# Patient Record
Sex: MALE | Race: WHITE | NOT HISPANIC OR LATINO | Employment: OTHER | URBAN - METROPOLITAN AREA
[De-identification: names, ages, dates, MRNs, and addresses within clinical notes are randomized per-mention and may not be internally consistent; named-entity substitution may affect disease eponyms.]

---

## 2017-08-18 DIAGNOSIS — I25.10 CORONARY ARTERY DISEASE INVOLVING NATIVE CORONARY ARTERY OF NATIVE HEART WITHOUT ANGINA PECTORIS: ICD-10-CM

## 2017-08-18 RX ORDER — IRBESARTAN 300 MG/1
300 TABLET ORAL AT BEDTIME
Qty: 90 TABLET | Refills: 1 | Status: SHIPPED | OUTPATIENT
Start: 2017-08-18 | End: 2017-12-26

## 2017-12-18 ENCOUNTER — PRE VISIT (OUTPATIENT)
Dept: CARDIOLOGY | Facility: CLINIC | Age: 67
End: 2017-12-18

## 2017-12-26 ENCOUNTER — OFFICE VISIT (OUTPATIENT)
Dept: CARDIOLOGY | Facility: CLINIC | Age: 67
End: 2017-12-26
Attending: NURSE PRACTITIONER
Payer: MEDICARE

## 2017-12-26 VITALS
BODY MASS INDEX: 25.97 KG/M2 | DIASTOLIC BLOOD PRESSURE: 85 MMHG | HEART RATE: 56 BPM | HEIGHT: 71 IN | SYSTOLIC BLOOD PRESSURE: 162 MMHG | WEIGHT: 185.5 LBS

## 2017-12-26 DIAGNOSIS — E78.2 MIXED HYPERLIPIDEMIA: ICD-10-CM

## 2017-12-26 DIAGNOSIS — I25.10 CORONARY ARTERY DISEASE INVOLVING NATIVE CORONARY ARTERY OF NATIVE HEART WITHOUT ANGINA PECTORIS: ICD-10-CM

## 2017-12-26 DIAGNOSIS — I10 BENIGN ESSENTIAL HYPERTENSION: Primary | ICD-10-CM

## 2017-12-26 LAB
CHOLEST SERPL-MCNC: 153 MG/DL
HDLC SERPL-MCNC: 37 MG/DL
LDLC SERPL CALC-MCNC: 84 MG/DL
NONHDLC SERPL-MCNC: 116 MG/DL
TRIGL SERPL-MCNC: 161 MG/DL

## 2017-12-26 PROCEDURE — 80061 LIPID PANEL: CPT | Performed by: INTERNAL MEDICINE

## 2017-12-26 PROCEDURE — 99214 OFFICE O/P EST MOD 30 MIN: CPT | Performed by: INTERNAL MEDICINE

## 2017-12-26 PROCEDURE — 36415 COLL VENOUS BLD VENIPUNCTURE: CPT | Performed by: INTERNAL MEDICINE

## 2017-12-26 RX ORDER — HYDROCHLOROTHIAZIDE 12.5 MG/1
25 TABLET ORAL DAILY
Qty: 60 TABLET | Refills: 11 | Status: SHIPPED | OUTPATIENT
Start: 2017-12-26 | End: 2018-03-16

## 2017-12-26 RX ORDER — NITROGLYCERIN 0.4 MG/1
0.4 TABLET SUBLINGUAL EVERY 5 MIN PRN
Qty: 25 TABLET | Refills: 12 | Status: SHIPPED | OUTPATIENT
Start: 2017-12-26 | End: 2018-07-10

## 2017-12-26 RX ORDER — OMEGA-3 FATTY ACIDS/FISH OIL 300-1000MG
200 CAPSULE ORAL EVERY 4 HOURS PRN
COMMUNITY
End: 2018-02-16

## 2017-12-26 RX ORDER — METOPROLOL TARTRATE 25 MG/1
25 TABLET, FILM COATED ORAL 2 TIMES DAILY
Qty: 180 TABLET | Refills: 12 | Status: SHIPPED | OUTPATIENT
Start: 2017-12-26 | End: 2018-02-16

## 2017-12-26 RX ORDER — IRBESARTAN 300 MG/1
300 TABLET ORAL AT BEDTIME
Qty: 90 TABLET | Refills: 1 | Status: SHIPPED | OUTPATIENT
Start: 2017-12-26 | End: 2018-08-10

## 2017-12-26 RX ORDER — ATORVASTATIN CALCIUM 80 MG/1
80 TABLET, FILM COATED ORAL DAILY
Qty: 90 TABLET | Refills: 12 | Status: SHIPPED | OUTPATIENT
Start: 2017-12-26 | End: 2018-02-27

## 2017-12-26 NOTE — PROGRESS NOTES
HISTORY OF PRESENT ILLNESS:  Asymptomatic. Weight up 10 lbs.     Orders this Visit:  No orders of the defined types were placed in this encounter.    Orders Placed This Encounter   Medications     ibuprofen (ADVIL) 200 MG capsule     Sig: Take 200 mg by mouth every 4 hours as needed for fever     hydrochlorothiazide 12.5 MG TABS tablet     Sig: Take 2 tablets (25 mg) by mouth daily     Dispense:  60 tablet     Refill:  11     There are no discontinued medications.    Encounter Diagnoses   Name Primary?     Coronary artery disease involving native coronary artery of native heart without angina pectoris      Mixed hyperlipidemia      Benign essential hypertension Yes       CURRENT MEDICATIONS:  Current Outpatient Prescriptions   Medication Sig Dispense Refill     ibuprofen (ADVIL) 200 MG capsule Take 200 mg by mouth every 4 hours as needed for fever       hydrochlorothiazide 12.5 MG TABS tablet Take 2 tablets (25 mg) by mouth daily 60 tablet 11     irbesartan (AVAPRO) 300 MG tablet Take 1 tablet (300 mg) by mouth At Bedtime 90 tablet 1     metoprolol (LOPRESSOR) 25 MG tablet Take 1 tablet (25 mg) by mouth 2 times daily 180 tablet 12     nitroglycerin (NITROSTAT) 0.4 MG sublingual tablet Place 1 tablet (0.4 mg) under the tongue every 5 minutes as needed for chest pain 25 tablet 12     atorvastatin (LIPITOR) 80 MG tablet Take 1 tablet (80 mg) by mouth daily 90 tablet 12     Multiple Vitamins-Minerals (PRESERVISION AREDS 2) CAPS Take by mouth daily       Multiple vitamin TABS Take by mouth daily        Ibuprofen-Diphenhydramine Cit (ADVIL PM) 200-38 MG TABS Take by mouth every evening as needed       aspirin 81 MG tablet Take 81 mg by mouth daily         ALLERGIES     Allergies   Allergen Reactions     Penicillins Rash       PAST MEDICAL, SURGICAL, FAMILY, SOCIAL HISTORY:  History was reviewed and updated as needed, see medical record.    Review of Systems:  A 12-point review of systems was completed, see medical record  "for detailed review of systems information.    Physical Exam:  Vitals: /85  Pulse 56  Ht 1.803 m (5' 11\")  Wt 84.1 kg (185 lb 8 oz)  BMI 25.87 kg/m2    Constitutional:           Skin:           Head:           Eyes:           ENT:           Neck:           Chest:           Cardiac:                    Abdomen:           Vascular:                                        Extremities and Back:           Neurological:           ASSESSMENT:  Needs more aggressive bp control.        RECOMMENDATIONS:  Add  Hctz  12.5 daily re check in [place] and [place] in spring      Recent Lab Results:  LIPID RESULTS:  Lab Results   Component Value Date    CHOL 153 12/26/2017    HDL 37 (L) 12/26/2017    LDL 84 12/26/2017    TRIG 161 (H) 12/26/2017    CHOLHDLRATIO 3.1 11/11/2015       LIVER ENZYME RESULTS:  Lab Results   Component Value Date    AST 36 08/30/2016    ALT 43 12/02/2016       CBC RESULTS:  Lab Results   Component Value Date    WBC 9.7 08/30/2016    RBC 5.42 08/30/2016    HGB 15.4 08/30/2016    HCT 46.7 08/30/2016    MCV 86 08/30/2016    MCH 28.4 08/30/2016    MCHC 33.0 08/30/2016    RDW 13.4 08/30/2016     08/30/2016       BMP RESULTS:  Lab Results   Component Value Date     08/30/2016    POTASSIUM 4.1 08/30/2016    CHLORIDE 106 08/30/2016    CO2 28 08/30/2016    ANIONGAP 7 08/30/2016     (H) 08/30/2016    BUN 22 08/30/2016    CR 0.91 08/30/2016    GFRESTIMATED 84 08/30/2016    GFRESTBLACK >90   GFR Calc   08/30/2016    ESPERANZA 8.8 08/30/2016        A1C RESULTS:  No results found for: A1C    INR RESULTS:  Lab Results   Component Value Date    INR 1.04 02/10/2008    INR 0.97 08/25/2007       We greatly appreciate the opportunity to be involved in the care of your patient, Gonzalo Tucker.    Sincerely,  Kuldeep Baldwin MD      CC  Caity Lewis, APRN CNP  [address]  [place] [address]                                                                     "

## 2017-12-26 NOTE — PROGRESS NOTES
HISTORY OF PRESENT ILLNESS:  Gonzalo Tucker, a 67-year-old man with hypertension, dyslipidemia and coronary artery disease was seen today at your request for followup.      Mr. Tucker sustained a non-ST segment elevation myocardial infarction in 2007 and underwent implantation of a 3.5 x 38 mm length Sirolimus-eluting Cypher stent in the LAD with good angiographic results.  There was no significant narrowing in the dominant right coronary, left main or circumflex.  A nuclear stress test in 2011 showed good exercise tolerance with no ischemia.      The patient was seen by one of our nurse clinicians about 1 year ago in clinic.  At that time his status was unchanged.      Since I last saw the patient, he has remained free of angina.  He denies chest, arm, neck, jaw or back discomfort with exertion.  He has gained about 10 pounds and his blood pressure was elevated today.      The patient plans to winter in [place] as is his custom and he plans to leave this coming weekend.      He has not been back to see you in your clinic this year.      PAST MEDICAL HISTORY:   1.  Dyslipidemia.   2.  Hypertension.   3.  Coronary artery disease, status post non-ST segment elevation MI with placement of a drug-coated stent in the LAD.  No significant narrowing in other coronary vessels.  Negative stress test in 2011.      PHYSICAL EXAMINATION:   GENERAL:  Exam today demonstrates a very pleasant, cooperative and intelligent 67-year-old man.   VITAL SIGNS:  His blood pressure is 162/85.  His heart rate is 56.  His height is 1.8 meters, his weight is 84 kg.  His BMI is 25.9.   LUNGS:  Clear to percussion and auscultation.   CARDIOVASCULAR:  Shows a normal S1 with a normal S2, there is no S3.  There is no murmur, rub or click.      LABORATORY STUDIES:  Lipid profile today shows an HDL cholesterol of 37, LDL of 80.  His triglycerides of 161.      ASSESSMENT:  Mr. Tucker is asymptomatic.  His blood pressure is suboptimally controlled,  possibly due to weight gain of about 10 pounds since we saw him last.  The patient is planning to go out of town but will be able to have his blood pressure checked in [place].  I have recommended the addition of a low-dose diuretic to improve blood pressure control.  I have reviewed the importance of regular exercise, a Mediterranean style, low-salt, weight loss diet and follow up of his blood pressure with his primary care providers.      RECOMMENDATIONS:   1.  Regular exercise at least 40 minutes 7 times per week if possible.   2.  Mediterranean style diet with a focus on weight loss.   3.  Salt restriction less than 4 grams of salt daily.   4.  Add hydrochlorothiazide 12.5 mg to the patient's current regimen.   5.  The patient will recheck his blood pressure when he returns to [place].  I have advised if his blood pressure remains elevated, he should see a physician there.  In the meantime, he will try to arrange a followup visit with you when he returns from [place] in Spring 2018..   6.  Followup visit with me in 1 year.      We greatly appreciate the opportunity to help care for your patient, Mr. Gonzalo Tucker.      cc:   Dio Rizvi MD    [place] [phone]         PRAVEEN ANGLIN MD             D: 2017 11:18   T: 2017 12:35   MT: NASH      Name:     GONZALO TUCKER   MRN:      [id number]        Account:      [id number]   :      1950           Service Date: 2017      Document: [id number]

## 2017-12-26 NOTE — MR AVS SNAPSHOT
"              After Visit Summary   12/26/2017    Gonzalo Tucker    MRN: [id number]           Patient Information     Date Of Birth          1950        Visit Information        Provider Department      12/26/2017 10:45 AM Kuldeep Baldwin MD [facility]        Today's Diagnoses     Benign essential hypertension    -  1    Coronary artery disease involving native coronary artery of native heart without angina pectoris        Mixed hyperlipidemia           Follow-ups after your visit        Additional Services     Follow-Up with Cardiologist                 Who to contact     If you have questions or need follow up information about today's clinic visit or your schedule please contact [facility] directly at [phone].  Normal or non-critical lab and imaging results will be communicated to you by MyChart, letter or phone within 4 business days after the clinic has received the results. If you do not hear from us within 7 days, please contact the clinic through MyChart or phone. If you have a critical or abnormal lab result, we will notify you by phone as soon as possible.  Submit refill requests through [facility] or call your pharmacy and they will forward the refill request to us. Please allow 3 business days for your refill to be completed.          Additional Information About Your Visit        MyChart Information     [facility] lets you send messages to your doctor, view your test results, renew your prescriptions, schedule appointments and more. To sign up, go to [email or web address] . Click on \"Log in\" on the left side of the screen, which will take you to the Welcome page. Then click on \"Sign up Now\" on the right side of the page.     You will be asked to enter the access code listed below, as well as some personal information. Please follow the directions to create your username and password.     Your access " "code is: [email or web address]  Expires: 3/29/2018 10:12 AM     Your access code will  in 90 days. If you need help or a new code, please call your [facility] clinic or [phone].        Care EveryWhere ID     This is your Care EveryWhere ID. This could be used by other organizations to access your [facility] medical records  [id number]        Your Vitals Were     Pulse Height BMI (Body Mass Index)             56 1.803 m (5' 11\") 25.87 kg/m2          Blood Pressure from Last 3 Encounters:   17 162/85   16 122/76   16 127/69    Weight from Last 3 Encounters:   17 84.1 kg (185 lb 8 oz)   16 79.7 kg (175 lb 11.2 oz)   16 77.1 kg (170 lb)              We Performed the Following     Follow-Up with Cardiologist          Today's Medication Changes          These changes are accurate as of: 17 11:59 PM.  If you have any questions, ask your nurse or doctor.               Start taking these medicines.        Dose/Directions    hydrochlorothiazide 12.5 MG Tabs tablet   Used for:  Coronary artery disease involving native coronary artery of native heart without angina pectoris, Mixed hyperlipidemia   Started by:  Kuldeep Baldwin MD        Dose:  25 mg   Take 2 tablets (25 mg) by mouth daily   Quantity:  60 tablet   Refills:  11            Where to get your medicines      These medications were sent to [place] [address] [place] [address] [address]     Phone:  [phone]     atorvastatin 80 MG tablet    hydrochlorothiazide 12.5 MG Tabs tablet    irbesartan 300 MG tablet    metoprolol 25 MG tablet    nitroGLYcerin 0.4 MG sublingual tablet                Primary Care Provider Office Phone # Fax #    Dio Rizvi [phone]       [facility] [address] [address] [address]        Equal Access to Services     ALEK BARTON AH: kera Coronado, maryana " kimani grahamespinoza rousseau ah. So [facility] [phone].    ATENCIÓN: Si esperanza naranjo, tiene a adler disposición servicios gratuitos de asistencia lingüística. Lauren al [phone].    We comply with applicable federal civil rights laws and [place] laws. We do not discriminate on the basis of race, color, national origin, age, disability, sex, sexual orientation, or gender identity.            Thank you!     Thank you for choosing [facility]  for your care. Our goal is always to provide you with excellent care. Hearing back from our patients is one way we can continue to improve our services. Please take a few minutes to complete the written survey that you may receive in the mail after your visit with us. Thank you!             Your Updated Medication List - Protect others around you: Learn how to safely use, store and throw away your medicines at www.disposemymeds.org.          This list is accurate as of: 12/26/17 11:59 PM.  Always use your most recent med list.                   Brand Name Dispense Instructions for use Diagnosis    ADVIL 200 MG capsule   Generic drug:  ibuprofen      Take 200 mg by mouth every 4 hours as needed for fever        ADVIL -38 MG Tabs   Generic drug:  Ibuprofen-Diphenhydramine Cit      Take by mouth every evening as needed        aspirin 81 MG tablet      Take 81 mg by mouth daily        atorvastatin 80 MG tablet    LIPITOR    90 tablet    Take 1 tablet (80 mg) by mouth daily    Coronary artery disease involving native coronary artery of native heart without angina pectoris, Mixed hyperlipidemia       hydrochlorothiazide 12.5 MG Tabs tablet     60 tablet    Take 2 tablets (25 mg) by mouth daily    Coronary artery disease involving native coronary artery of native heart without angina pectoris, Mixed hyperlipidemia       irbesartan 300 MG tablet    AVAPRO    90 tablet    Take 1 tablet (300 mg) by mouth At  Bedtime    Coronary artery disease involving native coronary artery of native heart without angina pectoris       metoprolol 25 MG tablet    LOPRESSOR    180 tablet    Take 1 tablet (25 mg) by mouth 2 times daily    Coronary artery disease involving native coronary artery of native heart without angina pectoris       Multiple vitamin Tabs      Take by mouth daily        nitroGLYcerin 0.4 MG sublingual tablet    NITROSTAT    25 tablet    Place 1 tablet (0.4 mg) under the tongue every 5 minutes as needed for chest pain    Coronary artery disease involving native coronary artery of native heart without angina pectoris       PRESERVISION AREDS 2 Caps      Take by mouth daily

## 2017-12-26 NOTE — LETTER
12/26/2017      Dio Rizvi MD  [facility] [address]  [facility] [address]      RE: Gonzalo Tucker       Dear Colleague,    I had the pleasure of seeing Gonzalo Tucker in the [facility].    HISTORY OF PRESENT ILLNESS:  Gonzalo Tucker, a 67-year-old man with hypertension, dyslipidemia and coronary artery disease was seen today at your request for followup.      Mr. Tucker sustained a non-ST segment elevation myocardial infarction in 2007 and underwent implantation of a 3.5 x 38 mm length Sirolimus-eluting Cypher stent in the LAD with good angiographic results.  There was no significant narrowing in the dominant right coronary, left main or circumflex.  A nuclear stress test in 2011 showed good exercise tolerance with no ischemia.      The patient was seen by one of our nurse clinicians about 1 year ago in clinic.  At that time his status was unchanged.      Since I last saw the patient, he has remained free of angina.  He denies chest, arm, neck, jaw or back discomfort with exertion.  He has gained about 10 pounds and his blood pressure was elevated today.      The patient plans to winter in [place] as is his custom and he plans to leave this coming weekend.      He has not been back to see you in your clinic this year.      PAST MEDICAL HISTORY:   1.  Dyslipidemia.   2.  Hypertension.   3.  Coronary artery disease, status post non-ST segment elevation MI with placement of a drug-coated stent in the LAD.  No significant narrowing in other coronary vessels.  Negative stress test in 2011.      PHYSICAL EXAMINATION:   GENERAL:  Exam today demonstrates a very pleasant, cooperative and intelligent 67-year-old man.   VITAL SIGNS:  His blood pressure is 162/85.  His heart rate is 56.  His height is 1.8 meters, his weight is 84 kg.  His BMI is 25.9.   LUNGS:  Clear to percussion and auscultation.   CARDIOVASCULAR:  Shows a normal S1 with a normal S2, there  is no S3.  There is no murmur, rub or click.      LABORATORY STUDIES:  Lipid profile today shows an HDL cholesterol of 37, LDL of 80.  His triglycerides of 161.      ASSESSMENT:  Mr. Tucker is asymptomatic.  His blood pressure is suboptimally controlled, possibly due to weight gain of about 10 pounds since we saw him last.  The patient is planning to go out of town but will be able to have his blood pressure checked in [place].  I have recommended the addition of a low-dose diuretic to improve blood pressure control.  I have reviewed the importance of regular exercise, a Mediterranean style, low-salt, weight loss diet and follow up of his blood pressure with his primary care providers.      RECOMMENDATIONS:   1.  Regular exercise at least 40 minutes 7 times per week if possible.   2.  Mediterranean style diet with a focus on weight loss.   3.  Salt restriction less than 4 grams of salt daily.   4.  Add hydrochlorothiazide 12.5 mg to the patient's current regimen.   5.  The patient will recheck his blood pressure when he returns to [place].  I have advised if his blood pressure remains elevated, he should see a physician there.  In the meantime, he will try to arrange a followup visit with you when he returns from [place] in Spring 2018..   6.  Followup visit with me in 1 year.      We greatly appreciate the opportunity to help care for your patient, Mr. Gonzalo Tucker.        Outpatient Encounter Prescriptions as of 12/26/2017   Medication Sig Dispense Refill     ibuprofen (ADVIL) 200 MG capsule Take 200 mg by mouth every 4 hours as needed for fever       hydrochlorothiazide 12.5 MG TABS tablet Take 2 tablets (25 mg) by mouth daily 60 tablet 11     atorvastatin (LIPITOR) 80 MG tablet Take 1 tablet (80 mg) by mouth daily 90 tablet 12     irbesartan (AVAPRO) 300 MG tablet Take 1 tablet (300 mg) by mouth At Bedtime 90 tablet 1     metoprolol (LOPRESSOR) 25 MG tablet Take 1 tablet (25 mg) by mouth 2 times daily 180  tablet 12     nitroGLYcerin (NITROSTAT) 0.4 MG sublingual tablet Place 1 tablet (0.4 mg) under the tongue every 5 minutes as needed for chest pain 25 tablet 12     Multiple Vitamins-Minerals (PRESERVISION AREDS 2) CAPS Take by mouth daily       Multiple vitamin TABS Take by mouth daily        Ibuprofen-Diphenhydramine Cit (ADVIL PM) 200-38 MG TABS Take by mouth every evening as needed       aspirin 81 MG tablet Take 81 mg by mouth daily       [DISCONTINUED] irbesartan (AVAPRO) 300 MG tablet Take 1 tablet (300 mg) by mouth At Bedtime 90 tablet 1     [DISCONTINUED] metoprolol (LOPRESSOR) 25 MG tablet Take 1 tablet (25 mg) by mouth 2 times daily 180 tablet 12     [DISCONTINUED] nitroglycerin (NITROSTAT) 0.4 MG sublingual tablet Place 1 tablet (0.4 mg) under the tongue every 5 minutes as needed for chest pain 25 tablet 12     [DISCONTINUED] atorvastatin (LIPITOR) 80 MG tablet Take 1 tablet (80 mg) by mouth daily 90 tablet 12     No facility-administered encounter medications on file as of 12/26/2017.        Again, thank you for allowing me to participate in the care of your patient.      Sincerely,    Kuldeep Baldwin MD     [facility]

## 2018-01-26 ENCOUNTER — TELEPHONE (OUTPATIENT)
Dept: CARDIOLOGY | Facility: CLINIC | Age: 68
End: 2018-01-26

## 2018-01-26 NOTE — TELEPHONE ENCOUNTER
Spoke with pt about his blood pressure being elevated. Pt reports that his blood pressure has been 150-160s/80s and is wondering about if his medications need an adjustments. Pt reports that at his 12/26 OV with Dr. Baldwin, his BP was elevated then and was told to monitor and add HCTZ. Explained to pt that since he is in [place] right now then he should see a physician down there to help manage his blood pressures. Pt verified that he has been taking hydrochlorothiazide daily. Pt will continue to monitor his blood pressures. Let pt know that this writer will update Dr. Baldwin and get back to him with any of his recommendations.

## 2018-01-29 NOTE — TELEPHONE ENCOUNTER
Thanks for the update Belkys.     If his blood pressure remains greater than our target of 140 mmHg or less ( and we prefer nearer to 130 if possible) the next step would be to add a medication to his diuretic. We usually add lisinopril as the next step, but cannot prescribe over the phone. I agree any changes are best made by the ROLAN MCELROY until he returns.

## 2018-01-29 NOTE — TELEPHONE ENCOUNTER
Spoke to pt about Dr. Baldwin's recommendations. Pt agreed that he will find a primary physician to consult about his HTN. Let pt know that if he needs any records faxed or more information that he can call [facility]'s phone.

## 2018-02-15 ENCOUNTER — PRE VISIT (OUTPATIENT)
Dept: CARDIOLOGY | Facility: CLINIC | Age: 68
End: 2018-02-15

## 2018-02-16 ENCOUNTER — OFFICE VISIT (OUTPATIENT)
Dept: CARDIOLOGY | Facility: CLINIC | Age: 68
End: 2018-02-16
Attending: INTERNAL MEDICINE
Payer: MEDICARE

## 2018-02-16 VITALS
HEIGHT: 71 IN | SYSTOLIC BLOOD PRESSURE: 137 MMHG | DIASTOLIC BLOOD PRESSURE: 82 MMHG | BODY MASS INDEX: 25.16 KG/M2 | WEIGHT: 179.7 LBS | HEART RATE: 50 BPM

## 2018-02-16 DIAGNOSIS — E78.2 MIXED HYPERLIPIDEMIA: ICD-10-CM

## 2018-02-16 DIAGNOSIS — I25.10 CORONARY ARTERY DISEASE INVOLVING NATIVE CORONARY ARTERY OF NATIVE HEART WITHOUT ANGINA PECTORIS: ICD-10-CM

## 2018-02-16 DIAGNOSIS — I10 BENIGN ESSENTIAL HYPERTENSION: ICD-10-CM

## 2018-02-16 PROCEDURE — 99214 OFFICE O/P EST MOD 30 MIN: CPT | Performed by: INTERNAL MEDICINE

## 2018-02-16 RX ORDER — CARVEDILOL 6.25 MG/1
6.25 TABLET ORAL 2 TIMES DAILY WITH MEALS
Qty: 60 TABLET | Refills: 11 | Status: SHIPPED | OUTPATIENT
Start: 2018-02-16 | End: 2018-05-31

## 2018-02-16 NOTE — LETTER
2/16/2018    Dio Rizvi MD  [facility] [address]  [facility] [address]    RE: Gonzalo Tucker       Dear Colleague,    I had the pleasure of seeing Gonzalo Tucker in the [facility].    HISTORY OF PRESENT ILLNESS:  SBP ave 151/79   139-161/[id number]    Orders this Visit:  Orders Placed This Encounter   Procedures     Follow-Up with Cardiologist     Orders Placed This Encounter   Medications     Acetaminophen (TYLENOL 8 HOUR PO)     diphenhydrAMINE-APAP, sleep, (TYLENOL PM EXTRA STRENGTH)  MG/30ML LIQD     carvedilol (COREG) 6.25 MG tablet     Sig: Take 1 tablet (6.25 mg) by mouth 2 times daily (with meals)     Dispense:  60 tablet     Refill:  11     Medications Discontinued During This Encounter   Medication Reason     ibuprofen (ADVIL) 200 MG capsule Not filled/taken by Patient     Multiple Vitamins-Minerals (PRESERVISION AREDS 2) CAPS Not filled/taken by Patient     metoprolol (LOPRESSOR) 25 MG tablet        Encounter Diagnoses   Name Primary?     Coronary artery disease involving native coronary artery of native heart without angina pectoris      Mixed hyperlipidemia      Benign essential hypertension        CURRENT MEDICATIONS:  Current Outpatient Prescriptions   Medication Sig Dispense Refill     Acetaminophen (TYLENOL 8 HOUR PO)        diphenhydrAMINE-APAP, sleep, (TYLENOL PM EXTRA STRENGTH)  MG/30ML LIQD        carvedilol (COREG) 6.25 MG tablet Take 1 tablet (6.25 mg) by mouth 2 times daily (with meals) 60 tablet 11     hydrochlorothiazide 12.5 MG TABS tablet Take 2 tablets (25 mg) by mouth daily 60 tablet 11     atorvastatin (LIPITOR) 80 MG tablet Take 1 tablet (80 mg) by mouth daily 90 tablet 12     irbesartan (AVAPRO) 300 MG tablet Take 1 tablet (300 mg) by mouth At Bedtime 90 tablet 1     nitroGLYcerin (NITROSTAT) 0.4 MG sublingual tablet Place 1 tablet (0.4 mg) under the tongue every 5 minutes as needed for chest pain 25  "tablet 12     Multiple vitamin TABS Take by mouth daily        Ibuprofen-Diphenhydramine Cit (ADVIL PM) 200-38 MG TABS Take by mouth every evening as needed       aspirin 81 MG tablet Take 81 mg by mouth daily         ALLERGIES     Allergies   Allergen Reactions     Penicillins Rash       PAST MEDICAL, SURGICAL, FAMILY, SOCIAL HISTORY:  History was reviewed and updated as needed, see medical record.    Review of Systems:  A 12-point review of systems was completed, see medical record for detailed review of systems information.    Physical Exam:  Vitals: /82 (BP Location: Right arm, Patient Position: Sitting, Cuff Size: Adult Regular)  Pulse 50  Ht 1.803 m (5' 11\")  Wt 81.5 kg (179 lb 11.2 oz)  BMI 25.06 kg/m2    Constitutional:           Skin:           Head:           Eyes:           ENT:           Neck:           Chest:           Cardiac:                    Abdomen:           Vascular:                                        Extremities and Back:           Neurological:           ASSESSMENT: change to carvedilol 6.25 bid  If this fails add amlodipine       RECOMMENDATIONS: fu in one month      Recent Lab Results:  LIPID RESULTS:  Lab Results   Component Value Date    CHOL 153 12/26/2017    HDL 37 (L) 12/26/2017    LDL 84 12/26/2017    TRIG 161 (H) 12/26/2017    CHOLHDLRATIO 3.1 11/11/2015       LIVER ENZYME RESULTS:  Lab Results   Component Value Date    AST 36 08/30/2016    ALT 43 12/02/2016       CBC RESULTS:  Lab Results   Component Value Date    WBC 9.7 08/30/2016    RBC 5.42 08/30/2016    HGB 15.4 08/30/2016    HCT 46.7 08/30/2016    MCV 86 08/30/2016    MCH 28.4 08/30/2016    MCHC 33.0 08/30/2016    RDW 13.4 08/30/2016     08/30/2016       BMP RESULTS:  Lab Results   Component Value Date     08/30/2016    POTASSIUM 4.1 08/30/2016    CHLORIDE 106 08/30/2016    CO2 28 08/30/2016    ANIONGAP 7 08/30/2016     (H) 08/30/2016    BUN 22 08/30/2016    CR 0.91 08/30/2016    GFRESTIMATED " 84 08/30/2016    GFRESTBLACK >90   GFR Calc   08/30/2016    ESPERANZA 8.8 08/30/2016        A1C RESULTS:  No results found for: A1C    INR RESULTS:  Lab Results   Component Value Date    INR 1.04 02/10/2008    INR 0.97 08/25/2007       We greatly appreciate the opportunity to be involved in the care of your patient, Gonzalo Tucker.    Sincerely,  Kuldeep Baldwin MD      CC  Kuldeep Baldwin MD  [id number] [id number]                                                                       Thank you for allowing me to participate in the care of your patient.      Sincerely,     Kuldepe Baldwin MD     [facility]    cc:   Kuldeep Baldwin MD  [address]  [place] [id number]

## 2018-02-16 NOTE — LETTER
2/16/2018      Dio Rizvi MD  [facility] [address]  [facility] [address]      RE: Gonzalo Tucker       Dear Colleague,    I had the pleasure of seeing Gonzalo Tucker in the [facility].    Service Date: 02/16/2018      HISTORY OF PRESENT ILLNESS:  Gonzalo Tucker, a 67-year-old man with hypertension, coronary artery disease, and dyslipidemia was seen today at his request for recommendations regarding management of hypertension.      Mr. Tucker sustained a non-ST segment elevation myocardial infarction in 2007 and underwent implantation of a 3.5 x 38 mm sirolimus-eluting Cypher stent in the LAD with good angiographic result.  There was no significant narrowing in the dominant right coronary, left main or circumflex.  A nuclear stress test in 2011 showed no ischemia with good exercise tolerance.      Since I last saw the patient, he has continued to winter in [place].  He returned to [place] this January 2018, however, because his brother-in-law is dying from metastatic cancer and is in hospice.  Mr. Tucker has been recording his blood pressures at home  and has noted his average blood pressure is 151/79.  The pressures have been as low as 132 systolic to as high as 161 systolic and as low as 74 diastolic to 87 diastolic.      The patient is currently on a combination of hydrochlorothiazide 25 mg daily, irbesartan 300 mg daily and metoprolol 25 mg b.i.d.      PAST MEDICAL HISTORY:   1.  Hypertension.   2.  Coronary disease, old history of stent implantation in LAD.  Negative nuclear stress test in 2011.   3.  Dyslipidemia.      PHYSICAL EXAMINATION:   GENERAL:  Exam today demonstrates a very pleasant and cooperative 67-year-old man.   VITAL SIGNS:  His blood pressure is 137/82 with a heart rate of 50.     LUNGS:  Clear to percussion and auscultation.   CARDIOVASCULAR:  Shows a normal S1 with a normal S2, there is no S3.  There is no murmur, rub  or click.  His pulses are full and symmetrical.        LABORATORY STUDIES:  His last cholesterol profile done 2017 showed an LDL of 84.  His last creatinine was 0.91.      ASSESSMENT:  Mr. Tucker is asymptomatic at this time, however average blood pressures are higher than we would prefer.  The American College of Cardiology advises a target systolic pressure of 130 if possible in patients with established vascular disease.  In addition, to lifestyle intervention, I would advise that we switch him from metoprolol to carvedilol, which is a more potent antihypertensive drug.  If we cannot achieve target systolic pressures over the course of the next 2-3 weeks on carvedilol, I would then add amlodipine.      I have reinstructed the patient on a Mediterranean style diet, exercise, weight loss and salt restriction.      RECOMMENDATIONS:   1.  Mediterranean style weight loss diet.   2.  Regular exercise of 40 minutes 4 times a week preferably 7 times a week.   3.  Restrict salt intake to less than 4 grams daily.   4.  Discontinue metoprolol and begin carvedilol 6.25 mg b.i.d.   5.  Followup visit in about 1 month to recheck blood pressure.  The patient will bring his recorded pressures in.  If he remains above our target of 130, I would consider adding amlodipine at that time.      We greatly appreciate the opportunity to care for your patient, Mr. Gonzalo Tucker.      cc:      Dio Rizvi MD    [place] [id number]         PRAVEEN ANGLIN MD             D: 2018   T: 2018   MT: NASH      Name:     GONZALO TUCKER   MRN:      [id number]        Account:      [id number]   :      1950           Service Date: 2018      Document: [id number]           Outpatient Encounter Prescriptions as of 2018   Medication Sig Dispense Refill     Acetaminophen (TYLENOL 8 HOUR PO)        diphenhydrAMINE-APAP, sleep, (TYLENOL PM EXTRA STRENGTH)   MG/30ML LIQD        carvedilol (COREG) 6.25 MG tablet Take 1 tablet (6.25 mg) by mouth 2 times daily (with meals) 60 tablet 11     hydrochlorothiazide 12.5 MG TABS tablet Take 2 tablets (25 mg) by mouth daily 60 tablet 11     atorvastatin (LIPITOR) 80 MG tablet Take 1 tablet (80 mg) by mouth daily 90 tablet 12     irbesartan (AVAPRO) 300 MG tablet Take 1 tablet (300 mg) by mouth At Bedtime 90 tablet 1     nitroGLYcerin (NITROSTAT) 0.4 MG sublingual tablet Place 1 tablet (0.4 mg) under the tongue every 5 minutes as needed for chest pain 25 tablet 12     Multiple vitamin TABS Take by mouth daily        Ibuprofen-Diphenhydramine Cit (ADVIL PM) 200-38 MG TABS Take by mouth every evening as needed       aspirin 81 MG tablet Take 81 mg by mouth daily       [DISCONTINUED] ibuprofen (ADVIL) 200 MG capsule Take 200 mg by mouth every 4 hours as needed for fever       [DISCONTINUED] metoprolol (LOPRESSOR) 25 MG tablet Take 1 tablet (25 mg) by mouth 2 times daily 180 tablet 12     [DISCONTINUED] Multiple Vitamins-Minerals (PRESERVISION AREDS 2) CAPS Take by mouth daily       No facility-administered encounter medications on file as of 2/16/2018.        Again, thank you for allowing me to participate in the care of your patient.      Sincerely,    Kuldeep Baldwin MD     [facility]

## 2018-02-16 NOTE — PROGRESS NOTES
Service Date: 02/16/2018      HISTORY OF PRESENT ILLNESS:  Gonzalo Tucker, a 67-year-old man with hypertension, coronary artery disease, and dyslipidemia was seen today at his request for recommendations regarding management of hypertension.      Mr. Tucker sustained a non-ST segment elevation myocardial infarction in 2007 and underwent implantation of a 3.5 x 38 mm sirolimus-eluting Cypher stent in the LAD with good angiographic result.  There was no significant narrowing in the dominant right coronary, left main or circumflex.  A nuclear stress test in 2011 showed no ischemia with good exercise tolerance.      Since I last saw the patient, he has continued to winter in [place].  He returned to [place] this January 2018, however, because his brother-in-law is dying from metastatic cancer and is in hospice.  Mr. Tucker has been recording his blood pressures at home  and has noted his average blood pressure is 151/79.  The pressures have been as low as 132 systolic to as high as 161 systolic and as low as 74 diastolic to 87 diastolic.      The patient is currently on a combination of hydrochlorothiazide 25 mg daily, irbesartan 300 mg daily and metoprolol 25 mg b.i.d.      PAST MEDICAL HISTORY:   1.  Hypertension.   2.  Coronary disease, old history of stent implantation in LAD.  Negative nuclear stress test in 2011.   3.  Dyslipidemia.      PHYSICAL EXAMINATION:   GENERAL:  Exam today demonstrates a very pleasant and cooperative 67-year-old man.   VITAL SIGNS:  His blood pressure is 137/82 with a heart rate of 50.     LUNGS:  Clear to percussion and auscultation.   CARDIOVASCULAR:  Shows a normal S1 with a normal S2, there is no S3.  There is no murmur, rub or click.  His pulses are full and symmetrical.        LABORATORY STUDIES:  His last cholesterol profile done 11/2017 showed an LDL of 84.  His last creatinine was 0.91.      ASSESSMENT:  Mr. Tucker is asymptomatic at this time, however average blood  pressures are higher than we would prefer.  The American College of Cardiology advises a target systolic pressure of 130 if possible in patients with established vascular disease.  In addition, to lifestyle intervention, I would advise that we switch him from metoprolol to carvedilol, which is a more potent antihypertensive drug.  If we cannot achieve target systolic pressures over the course of the next 2-3 weeks on carvedilol, I would then add amlodipine.      I have reinstructed the patient on a Mediterranean style diet, exercise, weight loss and salt restriction.      RECOMMENDATIONS:   1.  Mediterranean style weight loss diet.   2.  Regular exercise of 40 minutes 4 times a week preferably 7 times a week.   3.  Restrict salt intake to less than 4 grams daily.   4.  Discontinue metoprolol and begin carvedilol 6.25 mg b.i.d.   5.  Followup visit in about 1 month to recheck blood pressure.  The patient will bring his recorded pressures in.  If he remains above our target of 130, I would consider adding amlodipine at that time.      We greatly appreciate the opportunity to care for your patient, Mr. Gonzalo Tucker.      cc:      Dio Rizvi MD    [place] [id number]         PRAVEEN ANGLIN MD             D: 2018   T: 2018   MT: NASH      Name:     GONZALO TUCKER   MRN:      [id number]        Account:      [id number]   :      1950           Service Date: 2018      Document: [id number]

## 2018-02-16 NOTE — PROGRESS NOTES
HISTORY OF PRESENT ILLNESS:  SBP ave 151/79   139-161/74-87    Orders this Visit:  Orders Placed This Encounter   Procedures     Follow-Up with Cardiologist     Orders Placed This Encounter   Medications     Acetaminophen (TYLENOL 8 HOUR PO)     diphenhydrAMINE-APAP, sleep, (TYLENOL PM EXTRA STRENGTH)  MG/30ML LIQD     carvedilol (COREG) 6.25 MG tablet     Sig: Take 1 tablet (6.25 mg) by mouth 2 times daily (with meals)     Dispense:  60 tablet     Refill:  11     Medications Discontinued During This Encounter   Medication Reason     ibuprofen (ADVIL) 200 MG capsule Not filled/taken by Patient     Multiple Vitamins-Minerals (PRESERVISION AREDS 2) CAPS Not filled/taken by Patient     metoprolol (LOPRESSOR) 25 MG tablet        Encounter Diagnoses   Name Primary?     Coronary artery disease involving native coronary artery of native heart without angina pectoris      Mixed hyperlipidemia      Benign essential hypertension        CURRENT MEDICATIONS:  Current Outpatient Prescriptions   Medication Sig Dispense Refill     Acetaminophen (TYLENOL 8 HOUR PO)        diphenhydrAMINE-APAP, sleep, (TYLENOL PM EXTRA STRENGTH)  MG/30ML LIQD        carvedilol (COREG) 6.25 MG tablet Take 1 tablet (6.25 mg) by mouth 2 times daily (with meals) 60 tablet 11     hydrochlorothiazide 12.5 MG TABS tablet Take 2 tablets (25 mg) by mouth daily 60 tablet 11     atorvastatin (LIPITOR) 80 MG tablet Take 1 tablet (80 mg) by mouth daily 90 tablet 12     irbesartan (AVAPRO) 300 MG tablet Take 1 tablet (300 mg) by mouth At Bedtime 90 tablet 1     nitroGLYcerin (NITROSTAT) 0.4 MG sublingual tablet Place 1 tablet (0.4 mg) under the tongue every 5 minutes as needed for chest pain 25 tablet 12     Multiple vitamin TABS Take by mouth daily        Ibuprofen-Diphenhydramine Cit (ADVIL PM) 200-38 MG TABS Take by mouth every evening as needed       aspirin 81 MG tablet Take 81 mg by mouth daily         ALLERGIES     Allergies   Allergen  "Reactions     Penicillins Rash       PAST MEDICAL, SURGICAL, FAMILY, SOCIAL HISTORY:  History was reviewed and updated as needed, see medical record.    Review of Systems:  A 12-point review of systems was completed, see medical record for detailed review of systems information.    Physical Exam:  Vitals: /82 (BP Location: Right arm, Patient Position: Sitting, Cuff Size: Adult Regular)  Pulse 50  Ht 1.803 m (5' 11\")  Wt 81.5 kg (179 lb 11.2 oz)  BMI 25.06 kg/m2    Constitutional:           Skin:           Head:           Eyes:           ENT:           Neck:           Chest:           Cardiac:                    Abdomen:           Vascular:                                        Extremities and Back:           Neurological:           ASSESSMENT: change to carvedilol 6.25 bid  If this fails add amlodipine       RECOMMENDATIONS: fu in one month      Recent Lab Results:  LIPID RESULTS:  Lab Results   Component Value Date    CHOL 153 12/26/2017    HDL 37 (L) 12/26/2017    LDL 84 12/26/2017    TRIG 161 (H) 12/26/2017    CHOLHDLRATIO 3.1 11/11/2015       LIVER ENZYME RESULTS:  Lab Results   Component Value Date    AST 36 08/30/2016    ALT 43 12/02/2016       CBC RESULTS:  Lab Results   Component Value Date    WBC 9.7 08/30/2016    RBC 5.42 08/30/2016    HGB 15.4 08/30/2016    HCT 46.7 08/30/2016    MCV 86 08/30/2016    MCH 28.4 08/30/2016    MCHC 33.0 08/30/2016    RDW 13.4 08/30/2016     08/30/2016       BMP RESULTS:  Lab Results   Component Value Date     08/30/2016    POTASSIUM 4.1 08/30/2016    CHLORIDE 106 08/30/2016    CO2 28 08/30/2016    ANIONGAP 7 08/30/2016     (H) 08/30/2016    BUN 22 08/30/2016    CR 0.91 08/30/2016    GFRESTIMATED 84 08/30/2016    GFRESTBLACK >90   GFR Calc   08/30/2016    ESPERANZA 8.8 08/30/2016        A1C RESULTS:  No results found for: A1C    INR RESULTS:  Lab Results   Component Value Date    INR 1.04 02/10/2008    INR 0.97 08/25/2007       We " greatly appreciate the opportunity to be involved in the care of your patient, Gonzalo Tucker.    Sincerely,  Kuldeep Baldwin MD      CC  Kuldeep Baldwin MD  [address]  [place] [id number]

## 2018-02-16 NOTE — MR AVS SNAPSHOT
After Visit Summary   2/16/2018    Gonzalo Tucker    MRN: [id number]           Patient Information     Date Of Birth          1950        Visit Information        Provider Department      2/16/2018 10:15 AM Kuldeep Baldwin MD [facility]        Today's Diagnoses     Coronary artery disease involving native coronary artery of native heart without angina pectoris        Mixed hyperlipidemia        Benign essential hypertension           Follow-ups after your visit        Additional Services     Follow-Up with Cardiologist                 Your next 10 appointments already scheduled     Mar 19, 2018  8:30 AM CDT   Return Visit with Kuldeep Baldwin MD   [facility] ([facility])    [address]  [facility] [id number] [phone]              Future tests that were ordered for you today     Open Future Orders        Priority Expected Expires Ordered    Follow-Up with Cardiologist Routine 3/18/2018 2/16/2019 2/16/2018            Who to contact     If you have questions or need follow up information about today's clinic visit or your schedule please contact [facility] directly at [phone].  Normal or non-critical lab and imaging results will be communicated to you by SparkBasehart, letter or phone within 4 business days after the clinic has received the results. If you do not hear from us within 7 days, please contact the clinic through SparkBasehart or phone. If you have a critical or abnormal lab result, we will notify you by phone as soon as possible.  Submit refill requests through Eptica or call your pharmacy and they will forward the refill request to us. Please allow 3 business days for your refill to be completed.          Additional Information About Your Visit        Eptica Information     Eptica lets you send messages to your  "doctor, view your test results, renew your prescriptions, schedule appointments and more. To sign up, go to [email or web address] . Click on \"Log in\" on the left side of the screen, which will take you to the Welcome page. Then click on \"Sign up Now\" on the right side of the page.     You will be asked to enter the access code listed below, as well as some personal information. Please follow the directions to create your username and password.     Your access code is: [email or web address]  Expires: 3/29/2018 10:12 AM     Your access code will  in 90 days. If you need help or a new code, please call your [place] clinic or [phone].        Care EveryWhere ID     This is your Care EveryWhere ID. This could be used by other organizations to access your [place] medical records  [id number]        Your Vitals Were     Pulse Height BMI (Body Mass Index)             50 1.803 m (5' 11\") 25.06 kg/m2          Blood Pressure from Last 3 Encounters:   18 137/82   17 162/85   16 122/76    Weight from Last 3 Encounters:   18 81.5 kg (179 lb 11.2 oz)   17 84.1 kg (185 lb 8 oz)   16 79.7 kg (175 lb 11.2 oz)              We Performed the Following     Follow-Up with Cardiologist          Today's Medication Changes          These changes are accurate as of 18 11:03 AM.  If you have any questions, ask your nurse or doctor.               Start taking these medicines.        Dose/Directions    carvedilol 6.25 MG tablet   Commonly known as:  COREG   Used for:  Benign essential hypertension, Mixed hyperlipidemia, Coronary artery disease involving native coronary artery of native heart without angina pectoris   Started by:  Kuldeep Baldwin MD        Dose:  6.25 mg   Take 1 tablet (6.25 mg) by mouth 2 times daily (with meals)   Quantity:  60 tablet   Refills:  11         Stop taking these medicines if you haven't already. Please contact your care team if you have questions.     metoprolol " tartrate 25 MG tablet   Commonly known as:  LOPRESSOR   Stopped by:  Kuldeep Baldwin MD                Where to get your medicines      These medications were sent to [place] [address] [facility] - [place], [place] - [address] [facility] [address]     Phone:  [phone]     carvedilol 6.25 MG tablet                Primary Care Provider Office Phone # Fax #    Dio Rizvi [phone]       [facility] [address]  [facility] [address]        Equal Access to Services     [facility] ORALIA : Hadii aad ku hadasho Soomaali, waaxda luqadaha, qaybta kaalmada adeegyahoney, kimani rousseau . So [facility] [phone].    ATENCIÓN: Si habla español, tiene a adler disposición servicios gratuitos de asistencia lingüística. [facility] [phone].    We comply with applicable federal civil rights laws and [place] laws. We do not discriminate on the basis of race, color, national origin, age, disability, sex, sexual orientation, or gender identity.            Thank you!     Thank you for choosing [facility]  for your care. Our goal is always to provide you with excellent care. Hearing back from our patients is one way we can continue to improve our services. Please take a few minutes to complete the written survey that you may receive in the mail after your visit with us. Thank you!             Your Updated Medication List - Protect others around you: Learn how to safely use, store and throw away your medicines at www.disposemymeds.org.          This list is accurate as of 2/16/18 11:03 AM.  Always use your most recent med list.                   Brand Name Dispense Instructions for use Diagnosis    ADVIL -38 MG Tabs   Generic drug:  Ibuprofen-Diphenhydramine Cit      Take by mouth every evening as needed        aspirin 81 MG tablet      Take 81 mg by mouth daily        atorvastatin 80 MG  tablet    LIPITOR    90 tablet    Take 1 tablet (80 mg) by mouth daily    Coronary artery disease involving native coronary artery of native heart without angina pectoris, Mixed hyperlipidemia       carvedilol 6.25 MG tablet    COREG    60 tablet    Take 1 tablet (6.25 mg) by mouth 2 times daily (with meals)    Benign essential hypertension, Mixed hyperlipidemia, Coronary artery disease involving native coronary artery of native heart without angina pectoris       hydrochlorothiazide 12.5 MG Tabs tablet     60 tablet    Take 2 tablets (25 mg) by mouth daily    Coronary artery disease involving native coronary artery of native heart without angina pectoris, Mixed hyperlipidemia       irbesartan 300 MG tablet    AVAPRO    90 tablet    Take 1 tablet (300 mg) by mouth At Bedtime    Coronary artery disease involving native coronary artery of native heart without angina pectoris       Multiple vitamin Tabs      Take by mouth daily        nitroGLYcerin 0.4 MG sublingual tablet    NITROSTAT    25 tablet    Place 1 tablet (0.4 mg) under the tongue every 5 minutes as needed for chest pain    Coronary artery disease involving native coronary artery of native heart without angina pectoris       TYLENOL 8 HOUR PO           TYLENOL PM EXTRA STRENGTH  MG/30ML Liqd   Generic drug:  diphenhydrAMINE-APAP (sleep)

## 2018-02-27 DIAGNOSIS — I25.10 CORONARY ARTERY DISEASE INVOLVING NATIVE CORONARY ARTERY OF NATIVE HEART WITHOUT ANGINA PECTORIS: ICD-10-CM

## 2018-02-27 DIAGNOSIS — E78.2 MIXED HYPERLIPIDEMIA: ICD-10-CM

## 2018-02-27 RX ORDER — ATORVASTATIN CALCIUM 80 MG/1
80 TABLET, FILM COATED ORAL DAILY
Qty: 90 TABLET | Refills: 3 | Status: SHIPPED | OUTPATIENT
Start: 2018-02-27 | End: 2018-03-02

## 2018-03-02 DIAGNOSIS — I25.10 CORONARY ARTERY DISEASE INVOLVING NATIVE CORONARY ARTERY OF NATIVE HEART WITHOUT ANGINA PECTORIS: ICD-10-CM

## 2018-03-02 DIAGNOSIS — E78.2 MIXED HYPERLIPIDEMIA: ICD-10-CM

## 2018-03-02 RX ORDER — ATORVASTATIN CALCIUM 80 MG/1
80 TABLET, FILM COATED ORAL DAILY
Qty: 90 TABLET | Refills: 0 | Status: SHIPPED | OUTPATIENT
Start: 2018-03-02 | End: 2019-05-06

## 2018-03-16 DIAGNOSIS — E78.2 MIXED HYPERLIPIDEMIA: ICD-10-CM

## 2018-03-16 DIAGNOSIS — I25.10 CORONARY ARTERY DISEASE INVOLVING NATIVE CORONARY ARTERY OF NATIVE HEART WITHOUT ANGINA PECTORIS: ICD-10-CM

## 2018-03-16 RX ORDER — HYDROCHLOROTHIAZIDE 12.5 MG/1
25 TABLET ORAL DAILY
Qty: 60 TABLET | Refills: 11 | Status: SHIPPED | OUTPATIENT
Start: 2018-03-16 | End: 2019-04-05

## 2018-03-19 ENCOUNTER — ALLIED HEALTH/NURSE VISIT (OUTPATIENT)
Dept: CARDIOLOGY | Facility: CLINIC | Age: 68
End: 2018-03-19
Payer: MEDICARE

## 2018-03-19 ENCOUNTER — TELEPHONE (OUTPATIENT)
Dept: CARDIOLOGY | Facility: CLINIC | Age: 68
End: 2018-03-19

## 2018-03-19 ENCOUNTER — OFFICE VISIT (OUTPATIENT)
Dept: CARDIOLOGY | Facility: CLINIC | Age: 68
End: 2018-03-19
Attending: INTERNAL MEDICINE
Payer: MEDICARE

## 2018-03-19 VITALS
HEIGHT: 71 IN | HEART RATE: 60 BPM | SYSTOLIC BLOOD PRESSURE: 128 MMHG | DIASTOLIC BLOOD PRESSURE: 80 MMHG | OXYGEN SATURATION: 98 % | BODY MASS INDEX: 25.06 KG/M2 | WEIGHT: 179 LBS

## 2018-03-19 DIAGNOSIS — I25.10 CORONARY ARTERY DISEASE INVOLVING NATIVE CORONARY ARTERY OF NATIVE HEART WITHOUT ANGINA PECTORIS: ICD-10-CM

## 2018-03-19 DIAGNOSIS — I10 BENIGN ESSENTIAL HYPERTENSION: ICD-10-CM

## 2018-03-19 DIAGNOSIS — E78.5 HYPERLIPIDEMIA: Primary | ICD-10-CM

## 2018-03-19 DIAGNOSIS — E78.2 MIXED HYPERLIPIDEMIA: ICD-10-CM

## 2018-03-19 PROCEDURE — 99214 OFFICE O/P EST MOD 30 MIN: CPT | Performed by: INTERNAL MEDICINE

## 2018-03-19 NOTE — NURSING NOTE
Pt in clinic to compare home BP cuff w/ christina BP.   See telephone encounter for BPs. COLETTE [place]

## 2018-03-19 NOTE — PROGRESS NOTES
"HISTORY OF PRESENT ILLNESS:  Jimena BP at home 151/84  Here 112/80  Repeat 128/80    Orders this Visit:  No orders of the defined types were placed in this encounter.    No orders of the defined types were placed in this encounter.    There are no discontinued medications.    Encounter Diagnoses   Name Primary?     Coronary artery disease involving native coronary artery of native heart without angina pectoris      Mixed hyperlipidemia      Benign essential hypertension        CURRENT MEDICATIONS:  Current Outpatient Prescriptions   Medication Sig Dispense Refill     hydrochlorothiazide 12.5 MG TABS tablet Take 2 tablets (25 mg) by mouth daily 60 tablet 11     atorvastatin (LIPITOR) 80 MG tablet Take 1 tablet (80 mg) by mouth daily 90 tablet 0     Acetaminophen (TYLENOL 8 HOUR PO)        diphenhydrAMINE-APAP, sleep, (TYLENOL PM EXTRA STRENGTH)  MG/30ML LIQD        carvedilol (COREG) 6.25 MG tablet Take 1 tablet (6.25 mg) by mouth 2 times daily (with meals) 60 tablet 11     irbesartan (AVAPRO) 300 MG tablet Take 1 tablet (300 mg) by mouth At Bedtime 90 tablet 1     nitroGLYcerin (NITROSTAT) 0.4 MG sublingual tablet Place 1 tablet (0.4 mg) under the tongue every 5 minutes as needed for chest pain 25 tablet 12     Multiple vitamin TABS Take by mouth daily        Ibuprofen-Diphenhydramine Cit (ADVIL PM) 200-38 MG TABS Take by mouth every evening as needed       aspirin 81 MG tablet Take 81 mg by mouth daily         ALLERGIES     Allergies   Allergen Reactions     Penicillins Rash       PAST MEDICAL, SURGICAL, FAMILY, SOCIAL HISTORY:  History was reviewed and updated as needed, see medical record.    Review of Systems:  A 12-point review of systems was completed, see medical record for detailed review of systems information.    Physical Exam:  Vitals: /80  Pulse 60  Ht 1.803 m (5' 11\")  Wt 81.2 kg (179 lb)  SpO2 98%  BMI 24.97 kg/m2    Constitutional:  cooperative, alert and oriented, well developed, well " nourished, in no acute distress        Skin:  warm and dry to the touch, no apparent skin lesions or masses noted        Head:  normocephalic, no masses or lesions        Eyes:  pupils equal and round, conjunctivae and lids unremarkable, sclera white, no xanthalasma, EOMS intact, no nystagmus        ENT:  no pallor or cyanosis, dentition good        Neck:  carotid pulses are full and equal bilaterally, JVP normal, no carotid bruit        Chest:  normal breath sounds, clear to auscultation, normal A-P diameter, normal symmetry, normal respiratory excursion, no use of accessory muscles        Cardiac: regular rhythm, normal S1/S2, no S3 or S4, apical impulse not displaced, no murmurs, gallops or rubs                  Abdomen:  abdomen soft, non-tender, BS normoactive, no mass, no HSM, no bruits        Vascular: pulses full and equal, no bruits auscultated                                      Extremities and Back:  no deformities, clubbing, cyanosis, erythema observed        Neurological:  no gross motor deficits        ASSESSMENT:  Looks as if there is a discrepancy between home and office bp       RECOMMENDATIONS: Bring device in and check again with nurses  If still question ambulatory monitoring       Recent Lab Results:  LIPID RESULTS:  Lab Results   Component Value Date    CHOL 153 12/26/2017    HDL 37 (L) 12/26/2017    LDL 84 12/26/2017    TRIG 161 (H) 12/26/2017    CHOLHDLRATIO 3.1 11/11/2015       LIVER ENZYME RESULTS:  Lab Results   Component Value Date    AST 36 08/30/2016    ALT 43 12/02/2016       CBC RESULTS:  Lab Results   Component Value Date    WBC 9.7 08/30/2016    RBC 5.42 08/30/2016    HGB 15.4 08/30/2016    HCT 46.7 08/30/2016    MCV 86 08/30/2016    MCH 28.4 08/30/2016    MCHC 33.0 08/30/2016    RDW 13.4 08/30/2016     08/30/2016       BMP RESULTS:  Lab Results   Component Value Date     08/30/2016    POTASSIUM 4.1 08/30/2016    CHLORIDE 106 08/30/2016    CO2 28 08/30/2016    ANIONGAP  7 08/30/2016     (H) 08/30/2016    BUN 22 08/30/2016    CR 0.91 08/30/2016    GFRESTIMATED 84 08/30/2016    GFRESTBLACK >90   GFR Calc   08/30/2016    ESPERAZNA 8.8 08/30/2016        A1C RESULTS:  No results found for: A1C    INR RESULTS:  Lab Results   Component Value Date    INR 1.04 02/10/2008    INR 0.97 08/25/2007       We greatly appreciate the opportunity to be involved in the care of your patient, Gonzalo Tucker.    Sincerely,  Kuldeep Baldwin MD        Kuldeep Baldwin MD  [address]  [place] [address]

## 2018-03-19 NOTE — MR AVS SNAPSHOT
"              After Visit Summary   3/19/2018    Gonzalo Tucker    MRN: [id number]           Patient Information     Date Of Birth          1950        Visit Information        Provider Department      3/19/2018 9:30 AM RU [facility] HRT NURSE [facility]        Today's Diagnoses     Hyperlipidemia    -  1       Follow-ups after your visit        Your next 10 appointments already scheduled     May 07, 2018  8:00 AM CDT   Return Visit with Kuldeep Baldwin MD   [facility] ([facility])    [address] [address] [address] [phone]              Future tests that were ordered for you today     Open Future Orders        Priority Expected Expires Ordered    Follow-Up with Cardiologist Routine 6/17/2018 3/19/2019 3/19/2018            Who to contact     If you have questions or need follow up information about today's clinic visit or your schedule please contact [facility] directly at [phone].  Normal or non-critical lab and imaging results will be communicated to you by Platypus Platformhart, letter or phone within 4 business days after the clinic has received the results. If you do not hear from us within 7 days, please contact the clinic through [employer] or phone. If you have a critical or abnormal lab result, we will notify you by phone as soon as possible.  Submit refill requests through Rootless or call your pharmacy and they will forward the refill request to us. Please allow 3 business days for your refill to be completed.          Additional Information About Your Visit        Platypus Platformhart Information     Rootless lets you send messages to your doctor, view your test results, renew your prescriptions, schedule appointments and more. To sign up, go to [email or web address] . Click on \"Log in\" on the left side of the screen, which will take you to the Welcome page. " "Then click on \"Sign up Now\" on the right side of the page.     You will be asked to enter the access code listed below, as well as some personal information. Please follow the directions to create your username and password.     Your access code is: [email or web address]  Expires: 3/29/2018 11:12 AM     Your access code will  in 90 days. If you need help or a new code, please call your [place] clinic or [phone].        Care EveryWhere ID     This is your Care EveryWhere ID. This could be used by other organizations to access your [place] medical records  [id number]         Blood Pressure from Last 3 Encounters:   18 128/80   18 137/82   17 162/85    Weight from Last 3 Encounters:   18 81.2 kg (179 lb)   18 81.5 kg (179 lb 11.2 oz)   17 84.1 kg (185 lb 8 oz)              Today, you had the following     No orders found for display       Primary Care Provider Office Phone # Fax #    Dio Rizvi [phone]       [facility] [address] [place] [address]        Equal Access to Services     [facility]: Hadii aad ku hadasho Soomaali, waaxda luqadaha, qaybta kaalmada adeegyada, kimani coleyin haylaurenn rose yanes. So [facility] [phone].    ATENCIÓN: Si habla español, tiene a adler disposición servicios gratuitos de asistencia lingüística. Llame al [phone].    We comply with applicable federal civil rights laws and [place] laws. We do not discriminate on the basis of race, color, national origin, age, disability, sex, sexual orientation, or gender identity.            Thank you!     Thank you for choosing [facility]  for your care. Our goal is always to provide you with excellent care. Hearing back from our patients is one way we can continue to improve our services. Please take a few minutes to complete the written survey that you may receive in the mail after your visit with " us. Thank you!             Your Updated Medication List - Protect others around you: Learn how to safely use, store and throw away your medicines at www.disposemymeds.org.          This list is accurate as of 3/19/18  9:50 AM.  Always use your most recent med list.                   Brand Name Dispense Instructions for use Diagnosis    ADVIL -38 MG Tabs   Generic drug:  Ibuprofen-Diphenhydramine Cit      Take by mouth every evening as needed        aspirin 81 MG tablet      Take 81 mg by mouth daily        atorvastatin 80 MG tablet    LIPITOR    90 tablet    Take 1 tablet (80 mg) by mouth daily    Coronary artery disease involving native coronary artery of native heart without angina pectoris, Mixed hyperlipidemia       carvedilol 6.25 MG tablet    COREG    60 tablet    Take 1 tablet (6.25 mg) by mouth 2 times daily (with meals)    Benign essential hypertension, Mixed hyperlipidemia, Coronary artery disease involving native coronary artery of native heart without angina pectoris       hydrochlorothiazide 12.5 MG Tabs tablet     60 tablet    Take 2 tablets (25 mg) by mouth daily    Coronary artery disease involving native coronary artery of native heart without angina pectoris, Mixed hyperlipidemia       irbesartan 300 MG tablet    AVAPRO    90 tablet    Take 1 tablet (300 mg) by mouth At Bedtime    Coronary artery disease involving native coronary artery of native heart without angina pectoris       Multiple vitamin Tabs      Take by mouth daily        nitroGLYcerin 0.4 MG sublingual tablet    NITROSTAT    25 tablet    Place 1 tablet (0.4 mg) under the tongue every 5 minutes as needed for chest pain    Coronary artery disease involving native coronary artery of native heart without angina pectoris       TYLENOL 8 HOUR PO           TYLENOL PM EXTRA STRENGTH  MG/30ML Liqd   Generic drug:  diphenhydrAMINE-APAP (sleep)

## 2018-03-19 NOTE — LETTER
3/19/2018      Dio Rizvi MD  [facility] [address]  [facility] [address]      RE: Gonzalo Tucker       Dear Colleague,    I had the pleasure of seeing Gonzalo Tucker in the [facility].    Service Date: 03/19/2018      HISTORY OF PRESENT ILLNESS:  Gonzalo Tucker, a 67-year-old man with hypertension, coronary artery disease and dyslipidemia was seen today at his request for followup of hypertension.      Mr. Tucker sustained a non-ST segment elevation MI in 2007 and underwent implantation of a 3.5 x 38 mm length Sirolimus-eluting Cypher stent in the LAD with good angiographic result.  There was no significant narrowing in the dominant right coronary, left main or circumflex.  Nuclear stress test in 2011 showed no ischemia with good exercise tolerance.      I saw the patient last month at his request for recommendations regarding blood pressure control.  He records his blood pressure regularly at home and noted he has averaged about 151/79.  We elected to switch the patient from metoprolol to carvedilol and have him record his blood pressures again.      Since I last saw him, he has no new complaints and has tolerated carvedilol well.  His average home blood pressure was 151/84.  Systolic pressures ranged from 149-164/69-94.      My nurses obtained a blood pressure of 112/80.  I obtained a blood pressure of 128/80.      PAST MEDICAL HISTORY:   1.  Hypertension.   2.  Coronary artery disease, status post stent implantation in LAD.  Negative nuclear stress test in 2011.   3.  Dyslipidemia.      PHYSICAL EXAMINATION:   GENERAL:  Examination today shows a very pleasant and cooperative 67-year-old man.   LUNGS:  Clear to percussion and auscultation.   CARDIOVASCULAR:  Shows a normal S1 with a normal S2.  There is no S3.  There is no S4.   VITAL SIGNS:  His blood pressure was 112/80 as measured by my nurse.  I obtained a blood pressure of 128/80.       ASSESSMENT:  There appears to be a discrepancy between the blood pressures measured as an outpatient and in our clinic.  The first issue is to establish if the patient is measuring his blood pressure accurately at home.  I have asked him to bring his device in and measure the blood pressure with our nurses to see if there is a discrepancy.  If there is still ongoing concern regarding the accuracy of his blood pressure measurements, we could consider ambulatory monitoring to help resolve the issue.      RECOMMENDATIONS:   1.  I have asked the patient to continue to follow a Mediterranean style weight loss diet, exercise and limit salt intake.   2.  The patient will bring his blood pressure device in today, and he will record blood pressures with our nurses (to see whether there is a discrepancy between his cuff pressure and that he is obtaining with the machine at home.   3.  If there is still a question regarding blood pressure control, we could consider ambulatory monitoring to resolve the issue.  The patient plans to return back to [place] tomorrow and the ambulatory monitoring could be performed here  or in [place] depending on his preference.   4.  I set up a visit in about 3 months to follow up his blood pressure here at his request.      We greatly appreciate the opportunity to care for this most pleasant man.      MD PRAVEEN Ha MD             D: 2018   T: 2018   MT: MAKAYLA      Name:     BHANU COURTNEY   MRN:      [id number]        Account:      [id number]   :      1950           Service Date: 2018      Document: [id number]           Outpatient Encounter Prescriptions as of 3/19/2018   Medication Sig Dispense Refill     hydrochlorothiazide 12.5 MG TABS tablet Take 2 tablets (25 mg) by mouth daily 60 tablet 11     atorvastatin (LIPITOR) 80 MG tablet Take 1 tablet (80 mg) by mouth daily 90 tablet 0     Acetaminophen (TYLENOL 8 HOUR PO)         diphenhydrAMINE-APAP, sleep, (TYLENOL PM EXTRA STRENGTH)  MG/30ML LIQD        carvedilol (COREG) 6.25 MG tablet Take 1 tablet (6.25 mg) by mouth 2 times daily (with meals) 60 tablet 11     irbesartan (AVAPRO) 300 MG tablet Take 1 tablet (300 mg) by mouth At Bedtime 90 tablet 1     nitroGLYcerin (NITROSTAT) 0.4 MG sublingual tablet Place 1 tablet (0.4 mg) under the tongue every 5 minutes as needed for chest pain 25 tablet 12     Multiple vitamin TABS Take by mouth daily        Ibuprofen-Diphenhydramine Cit (ADVIL PM) 200-38 MG TABS Take by mouth every evening as needed       aspirin 81 MG tablet Take 81 mg by mouth daily       No facility-administered encounter medications on file as of 3/19/2018.        Again, thank you for allowing me to participate in the care of your patient.      Sincerely,    Kuldeep Baldwin MD     [facility]

## 2018-03-19 NOTE — LETTER
3/19/2018    Dio Rizvi MD  [facility] [address]  [facility] [address]    RE: Gonzalo Tucker       Dear Colleague,    I had the pleasure of seeing Gonzalo HASMUKH Tucker in the [facility].    HISTORY OF PRESENT ILLNESS:  Jimena BP at home 151/84  Here 112/80  Repeat 128/80    Orders this Visit:  No orders of the defined types were placed in this encounter.    No orders of the defined types were placed in this encounter.    There are no discontinued medications.    Encounter Diagnoses   Name Primary?     Coronary artery disease involving native coronary artery of native heart without angina pectoris      Mixed hyperlipidemia      Benign essential hypertension        CURRENT MEDICATIONS:  Current Outpatient Prescriptions   Medication Sig Dispense Refill     hydrochlorothiazide 12.5 MG TABS tablet Take 2 tablets (25 mg) by mouth daily 60 tablet 11     atorvastatin (LIPITOR) 80 MG tablet Take 1 tablet (80 mg) by mouth daily 90 tablet 0     Acetaminophen (TYLENOL 8 HOUR PO)        diphenhydrAMINE-APAP, sleep, (TYLENOL PM EXTRA STRENGTH)  MG/30ML LIQD        carvedilol (COREG) 6.25 MG tablet Take 1 tablet (6.25 mg) by mouth 2 times daily (with meals) 60 tablet 11     irbesartan (AVAPRO) 300 MG tablet Take 1 tablet (300 mg) by mouth At Bedtime 90 tablet 1     nitroGLYcerin (NITROSTAT) 0.4 MG sublingual tablet Place 1 tablet (0.4 mg) under the tongue every 5 minutes as needed for chest pain 25 tablet 12     Multiple vitamin TABS Take by mouth daily        Ibuprofen-Diphenhydramine Cit (ADVIL PM) 200-38 MG TABS Take by mouth every evening as needed       aspirin 81 MG tablet Take 81 mg by mouth daily         ALLERGIES     Allergies   Allergen Reactions     Penicillins Rash       PAST MEDICAL, SURGICAL, FAMILY, SOCIAL HISTORY:  History was reviewed and updated as needed, see medical record.    Review of Systems:  A 12-point review of systems was completed, see  "medical record for detailed review of systems information.    Physical Exam:  Vitals: /80  Pulse 60  Ht 1.803 m (5' 11\")  Wt 81.2 kg (179 lb)  SpO2 98%  BMI 24.97 kg/m2    Constitutional:  cooperative, alert and oriented, well developed, well nourished, in no acute distress        Skin:  warm and dry to the touch, no apparent skin lesions or masses noted        Head:  normocephalic, no masses or lesions        Eyes:  pupils equal and round, conjunctivae and lids unremarkable, sclera white, no xanthalasma, EOMS intact, no nystagmus        ENT:  no pallor or cyanosis, dentition good        Neck:  carotid pulses are full and equal bilaterally, JVP normal, no carotid bruit        Chest:  normal breath sounds, clear to auscultation, normal A-P diameter, normal symmetry, normal respiratory excursion, no use of accessory muscles        Cardiac: regular rhythm, normal S1/S2, no S3 or S4, apical impulse not displaced, no murmurs, gallops or rubs                  Abdomen:  abdomen soft, non-tender, BS normoactive, no mass, no HSM, no bruits        Vascular: pulses full and equal, no bruits auscultated                                      Extremities and Back:  no deformities, clubbing, cyanosis, erythema observed        Neurological:  no gross motor deficits        ASSESSMENT:  Looks as if there is a discrepancy between home and office bp       RECOMMENDATIONS: Bring device in and check again with nurses  If still question ambulatory monitoring       Recent Lab Results:  LIPID RESULTS:  Lab Results   Component Value Date    CHOL 153 12/26/2017    HDL 37 (L) 12/26/2017    LDL 84 12/26/2017    TRIG 161 (H) 12/26/2017    CHOLHDLRATIO 3.1 11/11/2015       LIVER ENZYME RESULTS:  Lab Results   Component Value Date    AST 36 08/30/2016    ALT 43 12/02/2016       CBC RESULTS:  Lab Results   Component Value Date    WBC 9.7 08/30/2016    RBC 5.42 08/30/2016    HGB 15.4 08/30/2016    HCT 46.7 08/30/2016    MCV 86 08/30/2016 "    MCH 28.4 08/30/2016    MCHC 33.0 08/30/2016    RDW 13.4 08/30/2016     08/30/2016       BMP RESULTS:  Lab Results   Component Value Date     08/30/2016    POTASSIUM 4.1 08/30/2016    CHLORIDE 106 08/30/2016    CO2 28 08/30/2016    ANIONGAP 7 08/30/2016     (H) 08/30/2016    BUN 22 08/30/2016    CR 0.91 08/30/2016    GFRESTIMATED 84 08/30/2016    GFRESTBLACK >90   GFR Calc   08/30/2016    ESPERANZA 8.8 08/30/2016        A1C RESULTS:  No results found for: A1C    INR RESULTS:  Lab Results   Component Value Date    INR 1.04 02/10/2008    INR 0.97 08/25/2007       We greatly appreciate the opportunity to be involved in the care of your patient, Gonzalo Tucker.    Sincerely,  Kuldeep Baldwin MD      CC  Kuldeep Baldwin MD  [address]  [place] [address]                                                                       Thank you for allowing me to participate in the care of your patient.      Sincerely,     Kuldeep Baldwin MD     [facility]    cc:   Kuldeep Baldwin MD  [address]  [place] [address]

## 2018-03-19 NOTE — TELEPHONE ENCOUNTER
Pt instructed by  to bring home cuff to clinic to compare BP readings.     Home Cuff:   152/86 (Left arm)  Eric BP:  142/80 (Left arm)    After an additional 5 mins of sitting  Home Cuff:   146/79 (Right arm)  Eric BP:  140/80 (Right arm)    Pt states he takes his BP randomly throughout the day. Advised pt to take BP about 2 hrs after taking BP meds in the AM and after sitting for a couple mins. Pt understands. Will route update to . COLETTE [place]

## 2018-03-19 NOTE — TELEPHONE ENCOUNTER
Called patient with recommendations per . Pt will continue to monitor BPs at home and think about establishing with PMD in [place]. Will call clinic if further questions arise. COLETTE [place]

## 2018-03-19 NOTE — TELEPHONE ENCOUNTER
"Thanks Rachel    I believe it is reasonable to continue to monitor his bp at home as you have advised. It looks as if his home device is about 10 mmHg over the manual cuff ( which I consider \"gold standard\")      He will be returning to [place] and so we will be unable to follow here until follow up visit on his return in early May 2018. Our eventual target is systolic bp 130mmHg  or less. He may be able to achieve this goal with exercise and diet over the next several weeks.  I have suggested he obtain a primary care MD in [place] to follow his bp. We would be happy to provide our input if there is a question about adding another drug ( I would consider amlodipine 10 mg daily.    Thanks Dr.M"

## 2018-03-19 NOTE — MR AVS SNAPSHOT
After Visit Summary   3/19/2018    Gonzalo Tucker    MRN: [id number]           Patient Information     Date Of Birth          1950        Visit Information        Provider Department      3/19/2018 8:30 AM Kuldeep Baldwin MD [facility]        Today's Diagnoses     Coronary artery disease involving native coronary artery of native heart without angina pectoris        Mixed hyperlipidemia        Benign essential hypertension           Follow-ups after your visit        Additional Services     Follow-Up with Cardiologist                 Your next 10 appointments already scheduled     May 07, 2018  8:00 AM CDT   Return Visit with Kuldeep Baldwin MD   [facility] ([facility])    [address]  [facility] [id number] [phone]              Future tests that were ordered for you today     Open Future Orders        Priority Expected Expires Ordered    Follow-Up with Cardiologist Routine 6/17/2018 3/19/2019 3/19/2018            Who to contact     If you have questions or need follow up information about today's clinic visit or your schedule please contact [facility] directly at [phone].  Normal or non-critical lab and imaging results will be communicated to you by freshbaghart, letter or phone within 4 business days after the clinic has received the results. If you do not hear from us within 7 days, please contact the clinic through freshbaghart or phone. If you have a critical or abnormal lab result, we will notify you by phone as soon as possible.  Submit refill requests through [employer] or call your pharmacy and they will forward the refill request to us. Please allow 3 business days for your refill to be completed.          Additional Information About Your Visit        [employer] Information     [employer] lets you send messages to your  "doctor, view your test results, renew your prescriptions, schedule appointments and more. To sign up, go to [email or web address] . Click on \"Log in\" on the left side of the screen, which will take you to the Welcome page. Then click on \"Sign up Now\" on the right side of the page.     You will be asked to enter the access code listed below, as well as some personal information. Please follow the directions to create your username and password.     Your access code is: [email or web address]  Expires: 3/29/2018 11:12 AM     Your access code will  in 90 days. If you need help or a new code, please call your [facility] clinic or [phone].        Care EveryWhere ID     This is your Care EveryWhere ID. This could be used by other organizations to access your [facility] medical records  [id number]        Your Vitals Were     Pulse Height Pulse Oximetry BMI (Body Mass Index)          60 1.803 m (5' 11\") 98% 24.97 kg/m2         Blood Pressure from Last 3 Encounters:   18 128/80   18 137/82   17 162/85    Weight from Last 3 Encounters:   18 81.2 kg (179 lb)   18 81.5 kg (179 lb 11.2 oz)   17 84.1 kg (185 lb 8 oz)              We Performed the Following     Follow-Up with Cardiologist        Primary Care Provider Office Phone # Fax #    Dio Rizvi [phone]       [facility] [id number] [place] [address]        Equal Access to Services     [facility] : Hadii michele hsieh hadashiglesia Sokurtis, waaxda luqadaha, qaybta kaalmakimani nascimento. So [facility] [phone].    ATENCIÓN: Si habla español, tiene a adler disposición servicios gratuitos de asistencia lingüística. Lauren al [phone].    We comply with applicable federal civil rights laws and [place] laws. We do not discriminate on the basis of race, color, national origin, age, disability, sex, sexual orientation, or gender identity.            Thank you! "     Thank you for choosing [facility]  for your care. Our goal is always to provide you with excellent care. Hearing back from our patients is one way we can continue to improve our services. Please take a few minutes to complete the written survey that you may receive in the mail after your visit with us. Thank you!             Your Updated Medication List - Protect others around you: Learn how to safely use, store and throw away your medicines at www.disposemymeds.org.          This list is accurate as of 3/19/18  8:53 AM.  Always use your most recent med list.                   Brand Name Dispense Instructions for use Diagnosis    ADVIL -38 MG Tabs   Generic drug:  Ibuprofen-Diphenhydramine Cit      Take by mouth every evening as needed        aspirin 81 MG tablet      Take 81 mg by mouth daily        atorvastatin 80 MG tablet    LIPITOR    90 tablet    Take 1 tablet (80 mg) by mouth daily    Coronary artery disease involving native coronary artery of native heart without angina pectoris, Mixed hyperlipidemia       carvedilol 6.25 MG tablet    COREG    60 tablet    Take 1 tablet (6.25 mg) by mouth 2 times daily (with meals)    Benign essential hypertension, Mixed hyperlipidemia, Coronary artery disease involving native coronary artery of native heart without angina pectoris       hydrochlorothiazide 12.5 MG Tabs tablet     60 tablet    Take 2 tablets (25 mg) by mouth daily    Coronary artery disease involving native coronary artery of native heart without angina pectoris, Mixed hyperlipidemia       irbesartan 300 MG tablet    AVAPRO    90 tablet    Take 1 tablet (300 mg) by mouth At Bedtime    Coronary artery disease involving native coronary artery of native heart without angina pectoris       Multiple vitamin Tabs      Take by mouth daily        nitroGLYcerin 0.4 MG sublingual tablet    NITROSTAT    25 tablet    Place 1 tablet (0.4 mg) under the tongue every 5  minutes as needed for chest pain    Coronary artery disease involving native coronary artery of native heart without angina pectoris       TYLENOL 8 HOUR PO           TYLENOL PM EXTRA STRENGTH  MG/30ML Liqd   Generic drug:  diphenhydrAMINE-APAP (sleep)

## 2018-05-31 ENCOUNTER — OFFICE VISIT (OUTPATIENT)
Dept: CARDIOLOGY | Facility: CLINIC | Age: 68
End: 2018-05-31
Attending: INTERNAL MEDICINE
Payer: MEDICARE

## 2018-05-31 VITALS
HEIGHT: 71 IN | DIASTOLIC BLOOD PRESSURE: 83 MMHG | BODY MASS INDEX: 25.06 KG/M2 | SYSTOLIC BLOOD PRESSURE: 133 MMHG | WEIGHT: 179 LBS | HEART RATE: 78 BPM

## 2018-05-31 DIAGNOSIS — E78.2 MIXED HYPERLIPIDEMIA: ICD-10-CM

## 2018-05-31 DIAGNOSIS — I10 BENIGN ESSENTIAL HYPERTENSION: ICD-10-CM

## 2018-05-31 DIAGNOSIS — I25.10 CORONARY ARTERY DISEASE INVOLVING NATIVE CORONARY ARTERY OF NATIVE HEART WITHOUT ANGINA PECTORIS: ICD-10-CM

## 2018-05-31 PROCEDURE — 99214 OFFICE O/P EST MOD 30 MIN: CPT | Performed by: INTERNAL MEDICINE

## 2018-05-31 RX ORDER — CARVEDILOL 6.25 MG/1
12.5 TABLET ORAL 2 TIMES DAILY WITH MEALS
Qty: 60 TABLET | Refills: 11 | Status: SHIPPED | OUTPATIENT
Start: 2018-05-31 | End: 2018-07-24

## 2018-05-31 RX ORDER — TAMSULOSIN HYDROCHLORIDE 0.4 MG/1
CAPSULE ORAL DAILY
COMMUNITY
End: 2019-12-26

## 2018-05-31 NOTE — MR AVS SNAPSHOT
"              After Visit Summary   5/31/2018    Gonzalo Tucker    MRN: [id number]           Patient Information     Date Of Birth          1950        Visit Information        Provider Department      5/31/2018 8:00 AM Kuldeep Baldwin MD [facility]        Today's Diagnoses     Coronary artery disease involving native coronary artery of native heart without angina pectoris        Mixed hyperlipidemia        Benign essential hypertension           Follow-ups after your visit        Additional Services     Follow-Up with Cardiac Advanced Practice Provider                 Future tests that were ordered for you today     Open Future Orders        Priority Expected Expires Ordered    Follow-Up with Cardiac Advanced Practice Provider Routine 6/30/2018 5/31/2019 5/31/2018            Who to contact     If you have questions or need follow up information about today's clinic visit or your schedule please contact [facility] directly at [phone].  Normal or non-critical lab and imaging results will be communicated to you by MyChart, letter or phone within 4 business days after the clinic has received the results. If you do not hear from us within 7 days, please contact the clinic through [employer] or phone. If you have a critical or abnormal lab result, we will notify you by phone as soon as possible.  Submit refill requests through [employer] or call your pharmacy and they will forward the refill request to us. Please allow 3 business days for your refill to be completed.          Additional Information About Your Visit        MyChart Information     [employer] lets you send messages to your doctor, view your test results, renew your prescriptions, schedule appointments and more. To sign up, go to [email or web address] . Click on \"Log in\" on the left side of the screen, which will take you to the Welcome page. Then click on " "\"Sign up Now\" on the right side of the page.     You will be asked to enter the access code listed below, as well as some personal information. Please follow the directions to create your username and password.     Your access code is: [email or web address]  Expires: 2018  7:53 AM     Your access code will  in 90 days. If you need help or a new code, please call your [facility] clinic or [phone].        Care EveryWhere ID     This is your Care EveryWhere ID. This could be used by other organizations to access your [facility] medical records  [id number]        Your Vitals Were     Pulse Height BMI (Body Mass Index)             78 1.803 m (5' 11\") 24.97 kg/m2          Blood Pressure from Last 3 Encounters:   18 133/83   18 128/80   18 137/82    Weight from Last 3 Encounters:   18 81.2 kg (179 lb)   18 81.2 kg (179 lb)   18 81.5 kg (179 lb 11.2 oz)              We Performed the Following     Follow-Up with Cardiologist          Today's Medication Changes          These changes are accurate as of 18  8:33 AM.  If you have any questions, ask your nurse or doctor.               These medicines have changed or have updated prescriptions.        Dose/Directions    carvedilol 6.25 MG tablet   Commonly known as:  COREG   This may have changed:  how much to take   Used for:  Benign essential hypertension, Mixed hyperlipidemia, Coronary artery disease involving native coronary artery of native heart without angina pectoris   Changed by:  Kuldeep Baldwin MD        Dose:  12.5 mg   Take 2 tablets (12.5 mg) by mouth 2 times daily (with meals)   Quantity:  60 tablet   Refills:  11            Where to get your medicines      These medications were sent to [place] [address] [place] [address] [address]     Phone:  [phone]     carvedilol 6.25 MG tablet                Primary Care Provider Office Phone # Fax #    Regan " MD Brianne [phone]       [facility] [address] [facility] [address]        Equal Access to Services     ALEK BARTON : Hadii aad ku haddanniiglesia Faulkner, niurkahoney platabeaha, caitlinta kajamalda myra, kimani viancain hayaabecka ramirezespinoza dubose soham yanes. So [facility] [phone].    ATENCIÓN: Si habla español, tiene a adler disposición servicios gratuitos de asistencia lingüística. Llame al [phone].    We comply with applicable federal civil rights laws and [place] laws. We do not discriminate on the basis of race, color, national origin, age, disability, sex, sexual orientation, or gender identity.            Thank you!     Thank you for choosing [facility]  for your care. Our goal is always to provide you with excellent care. Hearing back from our patients is one way we can continue to improve our services. Please take a few minutes to complete the written survey that you may receive in the mail after your visit with us. Thank you!             Your Updated Medication List - Protect others around you: Learn how to safely use, store and throw away your medicines at www.disposemymeds.org.          This list is accurate as of 5/31/18  8:33 AM.  Always use your most recent med list.                   Brand Name Dispense Instructions for use Diagnosis    ADVIL -38 MG Tabs   Generic drug:  Ibuprofen-Diphenhydramine Cit      Take by mouth every evening as needed        aspirin 81 MG tablet      Take 81 mg by mouth daily        atorvastatin 80 MG tablet    LIPITOR    90 tablet    Take 1 tablet (80 mg) by mouth daily    Coronary artery disease involving native coronary artery of native heart without angina pectoris, Mixed hyperlipidemia       carvedilol 6.25 MG tablet    COREG    60 tablet    Take 2 tablets (12.5 mg) by mouth 2 times daily (with meals)    Benign essential hypertension, Mixed hyperlipidemia, Coronary artery disease involving native  coronary artery of native heart without angina pectoris       FLOMAX 0.4 MG capsule   Generic drug:  tamsulosin      Take by mouth daily        hydrochlorothiazide 12.5 MG Tabs tablet     60 tablet    Take 2 tablets (25 mg) by mouth daily    Coronary artery disease involving native coronary artery of native heart without angina pectoris, Mixed hyperlipidemia       irbesartan 300 MG tablet    AVAPRO    90 tablet    Take 1 tablet (300 mg) by mouth At Bedtime    Coronary artery disease involving native coronary artery of native heart without angina pectoris       Multiple vitamin Tabs      Take by mouth daily        nitroGLYcerin 0.4 MG sublingual tablet    NITROSTAT    25 tablet    Place 1 tablet (0.4 mg) under the tongue every 5 minutes as needed for chest pain    Coronary artery disease involving native coronary artery of native heart without angina pectoris       TYLENOL 8 HOUR PO           TYLENOL PM EXTRA STRENGTH  MG/30ML Liqd   Generic drug:  diphenhydrAMINE-APAP (sleep)

## 2018-05-31 NOTE — LETTER
5/31/2018    Regan Archuleta MD  [facility] [address] [facility] [address]    RE: Gonzalo Tucker       Dear Colleague,    I had the pleasure of seeing Gonzalo Tucker in the [facility].    HISTORY OF PRESENT ILLNESS:  Not satisfied with bp control,although appears controlled in office. Prefers target 130 mmHg,    Orders this Visit:  No orders of the defined types were placed in this encounter.    Orders Placed This Encounter   Medications     tamsulosin (FLOMAX) 0.4 MG capsule     Sig: Take by mouth daily     carvedilol (COREG) 6.25 MG tablet     Sig: Take 2 tablets (12.5 mg) by mouth 2 times daily (with meals)     Dispense:  60 tablet     Refill:  11     Medications Discontinued During This Encounter   Medication Reason     carvedilol (COREG) 6.25 MG tablet Reorder       Encounter Diagnoses   Name Primary?     Coronary artery disease involving native coronary artery of native heart without angina pectoris      Mixed hyperlipidemia      Benign essential hypertension        CURRENT MEDICATIONS:  Current Outpatient Prescriptions   Medication Sig Dispense Refill     Acetaminophen (TYLENOL 8 HOUR PO)        aspirin 81 MG tablet Take 81 mg by mouth daily       atorvastatin (LIPITOR) 80 MG tablet Take 1 tablet (80 mg) by mouth daily 90 tablet 0     carvedilol (COREG) 6.25 MG tablet Take 2 tablets (12.5 mg) by mouth 2 times daily (with meals) 60 tablet 11     diphenhydrAMINE-APAP, sleep, (TYLENOL PM EXTRA STRENGTH)  MG/30ML LIQD        hydrochlorothiazide 12.5 MG TABS tablet Take 2 tablets (25 mg) by mouth daily 60 tablet 11     Ibuprofen-Diphenhydramine Cit (ADVIL PM) 200-38 MG TABS Take by mouth every evening as needed       irbesartan (AVAPRO) 300 MG tablet Take 1 tablet (300 mg) by mouth At Bedtime 90 tablet 1     Multiple vitamin TABS Take by mouth daily        nitroGLYcerin (NITROSTAT) 0.4 MG sublingual tablet Place 1 tablet (0.4 mg) under the tongue  "every 5 minutes as needed for chest pain 25 tablet 12     tamsulosin (FLOMAX) 0.4 MG capsule Take by mouth daily       [DISCONTINUED] carvedilol (COREG) 6.25 MG tablet Take 1 tablet (6.25 mg) by mouth 2 times daily (with meals) 60 tablet 11       ALLERGIES     Allergies   Allergen Reactions     Penicillins Rash       PAST MEDICAL, SURGICAL, FAMILY, SOCIAL HISTORY:  History was reviewed and updated as needed, see medical record.    Review of Systems:  A 12-point review of systems was completed, see medical record for detailed review of systems information.    Physical Exam:  Vitals: /83  Pulse 78  Ht 1.803 m (5' 11\")  Wt 81.2 kg (179 lb)  BMI 24.97 kg/m2    Constitutional:  cooperative, alert and oriented, well developed, well nourished, in no acute distress        Skin:  warm and dry to the touch, no apparent skin lesions or masses noted        Head:  normocephalic, no masses or lesions        Eyes:  pupils equal and round, conjunctivae and lids unremarkable, sclera white, no xanthalasma, EOMS intact, no nystagmus        ENT:  no pallor or cyanosis, dentition good        Neck:  carotid pulses are full and equal bilaterally, JVP normal, no carotid bruit        Chest:  normal breath sounds, clear to auscultation, normal A-P diameter, normal symmetry, normal respiratory excursion, no use of accessory muscles        Cardiac: regular rhythm, normal S1/S2, no S3 or S4, apical impulse not displaced, no murmurs, gallops or rubs                  Abdomen:  abdomen soft, non-tender, BS normoactive, no mass, no HSM, no bruits        Vascular: pulses full and equal, no bruits auscultated                                      Extremities and Back:  no deformities, clubbing, cyanosis, erythema observed        Neurological:  no gross motor deficits        ASSESSMENT: Increase bb       RECOMMENDATIONS:  Carvedilol increase 12.5 bid                                              FU  1 month      Recent Lab Results:  LIPID " RESULTS:  Lab Results   Component Value Date    CHOL 153 12/26/2017    HDL 37 (L) 12/26/2017    LDL 84 12/26/2017    TRIG 161 (H) 12/26/2017    CHOLHDLRATIO 3.1 11/11/2015       LIVER ENZYME RESULTS:  Lab Results   Component Value Date    AST 36 08/30/2016    ALT 43 12/02/2016       CBC RESULTS:  Lab Results   Component Value Date    WBC 9.7 08/30/2016    RBC 5.42 08/30/2016    HGB 15.4 08/30/2016    HCT 46.7 08/30/2016    MCV 86 08/30/2016    MCH 28.4 08/30/2016    MCHC 33.0 08/30/2016    RDW 13.4 08/30/2016     08/30/2016       BMP RESULTS:  Lab Results   Component Value Date     08/30/2016    POTASSIUM 4.1 08/30/2016    CHLORIDE 106 08/30/2016    CO2 28 08/30/2016    ANIONGAP 7 08/30/2016     (H) 08/30/2016    BUN 22 08/30/2016    CR 0.91 08/30/2016    GFRESTIMATED 84 08/30/2016    GFRESTBLACK >90   GFR Calc   08/30/2016    ESPERANZA 8.8 08/30/2016        A1C RESULTS:  No results found for: A1C    INR RESULTS:  Lab Results   Component Value Date    INR 1.04 02/10/2008    INR 0.97 08/25/2007       We greatly appreciate the opportunity to be involved in the care of your patient, Gonzalo Tucker.    Sincerely,  Kuldeep Baldwin MD      CC  Kuldeep Baldwin MD  [address]  [place] [id number]                                                                       Thank you for allowing me to participate in the care of your patient.      Sincerely,     Kuldeep Baldwin MD     [facility]    cc:   Kuldeep Baldwin MD  [address]  [place] [id number]

## 2018-05-31 NOTE — PROGRESS NOTES
Service Date: 05/31/2018      HISTORY OF PRESENT ILLNESS:  Gonzalo Tucker, a 67-year-old man with hypertension, coronary artery disease, and dyslipidemia was seen today at his request for followup of hypertension.      Mr. Tucker has a longstanding history of hypertension.  Over the last year, he notes  that his systolic blood pressures recorded with his  Home device  had averaged about 150mmHg.  At this point, we began to make changes in his medical regimen.  He was switched from metoprolol to carvedilol.  He is presently on maximal doses of irbesartan and hydrochlorothiazide.  He has been on carvedilol 6.25 mg b.i.d.      Review of our office records indicates the patient's systolic pressure has been less than 140 on 3 separate visits since 12/2017.  At home; however, it appears systolic pressures run in the 150 range at times.      A followup visit was scheduled today to discuss treatment of the patient's hypertension.      PAST MEDICAL HISTORY:   1.  Coronary artery disease:   a.  Non-ST segment elevation MI in 2007.   b.  History of implantation of 3.5 x 38 mm length Sirolimus-Eluting Cypher stent in the LAD.  No significant narrowing in dominant right coronary, left main or circumflex.  Negative nuclear stress test 2011.   2.  Hypertension.   3.  Dyslipidemia.   4.  Prostatism, on tamsulosin.      PHYSICAL EXAMINATION:   GENERAL:  Exam today demonstrates a very pleasant and cooperative 67-year-old man.   VITAL SIGNS:  His blood pressure was 133/83, his heart rate is 78.  His height is 1.8 meters, his weight is 81.2 kg.  His BMI is 25.02.   LUNGS:  Clear to percussion and auscultation.   CARDIOVASCULAR:  Shows a normal S1 with a normal S2.  There is no S3.  There is no murmur, rub or click.      ASSESSMENT:  Mr. Tucker's systolic pressures appear to be adequately controlled in our office.  The patient, however, is quite concerned that they are not well controlled outside the office setting.  There is  controversy between  n regarding the ideal target systolic pressure.  Presently, the American College of Cardiology has a consensus target of a systolic pressure of 130mmHg or less.  I explained to Mr. Tucker it would be reasonable to increase his carvedilol.  We could consider ambulatory monitoring as well if there is still some question regarding whether he is  adequately controlled.      RECOMMENDATIONS:   1.  Increase carvedilol to 12.5 mg b.i.d.   2.  Continue irbesartan 300 mg daily.   3.  Hydrochlorothiazide 25 mg daily.   4.  Follow up with advanced practitioner in the next 3-4 weeks to recheck systolic pressure and to correlate with the patient's cuff.  I asked the patient to record his pressures at home and to bring his blood pressure cuff machine in so we can correlate with our office findings.      Ultimately, if we determine the patient's systolic pressure is not adequately controlled, we could consider ambulatory monitoring and/or the addition of another agent.      We appreciate the opportunity to be involved in the care of Mr. Gonzalo Tucker.         PRAVEEN ANGLIN MD             D: 2018   T: 2018   MT: JASS      Name:     GONZALO TUCKER   MRN:      [id number]        Account:      [id number]   :      1950           Service Date: 2018      Document: [id number]

## 2018-05-31 NOTE — LETTER
5/31/2018      Regan Archuleta [facility]  [facility] [address] [facility] [address]      RE: Gonzalo Tucker       Dear Colleague,    I had the pleasure of seeing Gonzalo Tucker in the [facility].    Service Date: 05/31/2018      HISTORY OF PRESENT ILLNESS:  Gonzalo Tucker, a 67-year-old man with hypertension, coronary artery disease, and dyslipidemia was seen today at his request for followup of hypertension.      Mr. Tucker has a longstanding history of hypertension.  Over the last year, he notes  that his systolic blood pressures recorded with his  Home device  had averaged about 150mmHg.  At this point, we began to make changes in his medical regimen.  He was switched from metoprolol to carvedilol.  He is presently on maximal doses of irbesartan and hydrochlorothiazide.  He has been on carvedilol 6.25 mg b.i.d.      Review of our office records indicates the patient's systolic pressure has been less than 140 on 3 separate visits since 12/2017.  At home; however, it appears systolic pressures run in the 150 range at times.      A followup visit was scheduled today to discuss treatment of the patient's hypertension.      PAST MEDICAL HISTORY:   1.  Coronary artery disease:   a.  Non-ST segment elevation MI in 2007.   b.  History of implantation of 3.5 x 38 mm length Sirolimus-Eluting Cypher stent in the LAD.  No significant narrowing in dominant right coronary, left main or circumflex.  Negative nuclear stress test 2011.   2.  Hypertension.   3.  Dyslipidemia.   4.  Prostatism, on tamsulosin.      PHYSICAL EXAMINATION:   GENERAL:  Exam today demonstrates a very pleasant and cooperative 67-year-old man.   VITAL SIGNS:  His blood pressure was 133/83, his heart rate is 78.  His height is 1.8 meters, his weight is 81.2 kg.  His BMI is 25.02.   LUNGS:  Clear to percussion and auscultation.   CARDIOVASCULAR:  Shows a normal S1 with a normal S2.  There is no S3.  There is  no murmur, rub or click.      ASSESSMENT:  Mr. Tucker's systolic pressures appear to be adequately controlled in our office.  The patient, however, is quite concerned that they are not well controlled outside the office setting.  There is controversy between  n regarding the ideal target systolic pressure.  Presently, the American College of Cardiology has a consensus target of a systolic pressure of 130mmHg or less.  I explained to Mr. Tucker it would be reasonable to increase his carvedilol.  We could consider ambulatory monitoring as well if there is still some question regarding whether he is  adequately controlled.      RECOMMENDATIONS:   1.  Increase carvedilol to 12.5 mg b.i.d.   2.  Continue irbesartan 300 mg daily.   3.  Hydrochlorothiazide 25 mg daily.   4.  Follow up with advanced practitioner in the next 3-4 weeks to recheck systolic pressure and to correlate with the patient's cuff.  I asked the patient to record his pressures at home and to bring his blood pressure cuff machine in so we can correlate with our office findings.      Ultimately, if we determine the patient's systolic pressure is not adequately controlled, we could consider ambulatory monitoring and/or the addition of another agent.      We appreciate the opportunity to be involved in the care of Mr. Gonzalo Tucker.         PRAVEEN ANGLIN MD             D: 2018   T: 2018   MT: JASS      Name:     GONZALO TUCKER   MRN:      [id number]        Account:      [id number]   :      1950           Service Date: 2018      Document: [id number]           Outpatient Encounter Prescriptions as of 2018   Medication Sig Dispense Refill     Acetaminophen (TYLENOL 8 HOUR PO)        aspirin 81 MG tablet Take 81 mg by mouth daily       atorvastatin (LIPITOR) 80 MG tablet Take 1 tablet (80 mg) by mouth daily 90 tablet 0     carvedilol (COREG) 6.25 MG tablet Take 2 tablets (12.5 mg) by mouth 2 times daily (with meals) 60  tablet 11     diphenhydrAMINE-APAP, sleep, (TYLENOL PM EXTRA STRENGTH)  MG/30ML LIQD        hydrochlorothiazide 12.5 MG TABS tablet Take 2 tablets (25 mg) by mouth daily 60 tablet 11     Ibuprofen-Diphenhydramine Cit (ADVIL PM) 200-38 MG TABS Take by mouth every evening as needed       irbesartan (AVAPRO) 300 MG tablet Take 1 tablet (300 mg) by mouth At Bedtime 90 tablet 1     Multiple vitamin TABS Take by mouth daily        nitroGLYcerin (NITROSTAT) 0.4 MG sublingual tablet Place 1 tablet (0.4 mg) under the tongue every 5 minutes as needed for chest pain 25 tablet 12     tamsulosin (FLOMAX) 0.4 MG capsule Take by mouth daily       [DISCONTINUED] carvedilol (COREG) 6.25 MG tablet Take 1 tablet (6.25 mg) by mouth 2 times daily (with meals) 60 tablet 11     No facility-administered encounter medications on file as of 5/31/2018.        Again, thank you for allowing me to participate in the care of your patient.      Sincerely,    Kuldeep Baldwin MD     [facility]

## 2018-05-31 NOTE — PROGRESS NOTES
HISTORY OF PRESENT ILLNESS:  Not satisfied with bp control,although appears controlled in office. Prefers target 130 mmHg,    Orders this Visit:  No orders of the defined types were placed in this encounter.    Orders Placed This Encounter   Medications     tamsulosin (FLOMAX) 0.4 MG capsule     Sig: Take by mouth daily     carvedilol (COREG) 6.25 MG tablet     Sig: Take 2 tablets (12.5 mg) by mouth 2 times daily (with meals)     Dispense:  60 tablet     Refill:  11     Medications Discontinued During This Encounter   Medication Reason     carvedilol (COREG) 6.25 MG tablet Reorder       Encounter Diagnoses   Name Primary?     Coronary artery disease involving native coronary artery of native heart without angina pectoris      Mixed hyperlipidemia      Benign essential hypertension        CURRENT MEDICATIONS:  Current Outpatient Prescriptions   Medication Sig Dispense Refill     Acetaminophen (TYLENOL 8 HOUR PO)        aspirin 81 MG tablet Take 81 mg by mouth daily       atorvastatin (LIPITOR) 80 MG tablet Take 1 tablet (80 mg) by mouth daily 90 tablet 0     carvedilol (COREG) 6.25 MG tablet Take 2 tablets (12.5 mg) by mouth 2 times daily (with meals) 60 tablet 11     diphenhydrAMINE-APAP, sleep, (TYLENOL PM EXTRA STRENGTH)  MG/30ML LIQD        hydrochlorothiazide 12.5 MG TABS tablet Take 2 tablets (25 mg) by mouth daily 60 tablet 11     Ibuprofen-Diphenhydramine Cit (ADVIL PM) 200-38 MG TABS Take by mouth every evening as needed       irbesartan (AVAPRO) 300 MG tablet Take 1 tablet (300 mg) by mouth At Bedtime 90 tablet 1     Multiple vitamin TABS Take by mouth daily        nitroGLYcerin (NITROSTAT) 0.4 MG sublingual tablet Place 1 tablet (0.4 mg) under the tongue every 5 minutes as needed for chest pain 25 tablet 12     tamsulosin (FLOMAX) 0.4 MG capsule Take by mouth daily       [DISCONTINUED] carvedilol (COREG) 6.25 MG tablet Take 1 tablet (6.25 mg) by mouth 2 times daily (with meals) 60 tablet 11  "      ALLERGIES     Allergies   Allergen Reactions     Penicillins Rash       PAST MEDICAL, SURGICAL, FAMILY, SOCIAL HISTORY:  History was reviewed and updated as needed, see medical record.    Review of Systems:  A 12-point review of systems was completed, see medical record for detailed review of systems information.    Physical Exam:  Vitals: /83  Pulse 78  Ht 1.803 m (5' 11\")  Wt 81.2 kg (179 lb)  BMI 24.97 kg/m2    Constitutional:  cooperative, alert and oriented, well developed, well nourished, in no acute distress        Skin:  warm and dry to the touch, no apparent skin lesions or masses noted        Head:  normocephalic, no masses or lesions        Eyes:  pupils equal and round, conjunctivae and lids unremarkable, sclera white, no xanthalasma, EOMS intact, no nystagmus        ENT:  no pallor or cyanosis, dentition good        Neck:  carotid pulses are full and equal bilaterally, JVP normal, no carotid bruit        Chest:  normal breath sounds, clear to auscultation, normal A-P diameter, normal symmetry, normal respiratory excursion, no use of accessory muscles        Cardiac: regular rhythm, normal S1/S2, no S3 or S4, apical impulse not displaced, no murmurs, gallops or rubs                  Abdomen:  abdomen soft, non-tender, BS normoactive, no mass, no HSM, no bruits        Vascular: pulses full and equal, no bruits auscultated                                      Extremities and Back:  no deformities, clubbing, cyanosis, erythema observed        Neurological:  no gross motor deficits        ASSESSMENT: Increase bb       RECOMMENDATIONS:  Carvedilol increase 12.5 bid                                              FU  1 month      Recent Lab Results:  LIPID RESULTS:  Lab Results   Component Value Date    CHOL 153 12/26/2017    HDL 37 (L) 12/26/2017    LDL 84 12/26/2017    TRIG 161 (H) 12/26/2017    CHOLHDLRATIO 3.1 11/11/2015       LIVER ENZYME RESULTS:  Lab Results   Component Value Date    AST 36 " 08/30/2016    ALT 43 12/02/2016       CBC RESULTS:  Lab Results   Component Value Date    WBC 9.7 08/30/2016    RBC 5.42 08/30/2016    HGB 15.4 08/30/2016    HCT 46.7 08/30/2016    MCV 86 08/30/2016    MCH 28.4 08/30/2016    MCHC 33.0 08/30/2016    RDW 13.4 08/30/2016     08/30/2016       BMP RESULTS:  Lab Results   Component Value Date     08/30/2016    POTASSIUM 4.1 08/30/2016    CHLORIDE 106 08/30/2016    CO2 28 08/30/2016    ANIONGAP 7 08/30/2016     (H) 08/30/2016    BUN 22 08/30/2016    CR 0.91 08/30/2016    GFRESTIMATED 84 08/30/2016    GFRESTBLACK >90   GFR Calc   08/30/2016    ESPERANZA 8.8 08/30/2016        A1C RESULTS:  No results found for: A1C    INR RESULTS:  Lab Results   Component Value Date    INR 1.04 02/10/2008    INR 0.97 08/25/2007       We greatly appreciate the opportunity to be involved in the care of your patient, Gonzalo Tucker.    Sincerely,  Kuldeep Baldwin MD        Kuldeep Baldwin MD  [address]  [place] [id number]

## 2018-07-10 ENCOUNTER — OFFICE VISIT (OUTPATIENT)
Dept: CARDIOLOGY | Facility: CLINIC | Age: 68
End: 2018-07-10
Payer: MEDICARE

## 2018-07-10 VITALS
OXYGEN SATURATION: 97 % | HEIGHT: 71 IN | HEART RATE: 60 BPM | DIASTOLIC BLOOD PRESSURE: 84 MMHG | SYSTOLIC BLOOD PRESSURE: 134 MMHG | WEIGHT: 178.5 LBS | BODY MASS INDEX: 24.99 KG/M2

## 2018-07-10 DIAGNOSIS — R03.0 ELEVATED BLOOD PRESSURE, SITUATIONAL: Primary | ICD-10-CM

## 2018-07-10 DIAGNOSIS — I25.10 CORONARY ARTERY DISEASE INVOLVING NATIVE CORONARY ARTERY OF NATIVE HEART WITHOUT ANGINA PECTORIS: ICD-10-CM

## 2018-07-10 DIAGNOSIS — E78.2 MIXED HYPERLIPIDEMIA: ICD-10-CM

## 2018-07-10 DIAGNOSIS — I10 BENIGN ESSENTIAL HYPERTENSION: ICD-10-CM

## 2018-07-10 PROCEDURE — 99214 OFFICE O/P EST MOD 30 MIN: CPT | Performed by: PHYSICIAN ASSISTANT

## 2018-07-10 RX ORDER — NITROGLYCERIN 0.4 MG/1
0.4 TABLET SUBLINGUAL EVERY 5 MIN PRN
Qty: 25 TABLET | Refills: 12 | Status: SHIPPED | OUTPATIENT
Start: 2018-07-10 | End: 2019-05-06

## 2018-07-10 NOTE — LETTER
7/10/2018    Regan Archuleta MD  [facility] [address] [address] [facility] [address]    RE: Gonzalo Tucker       Dear Colleague,    I had the pleasure of seeing Gonzalo Tucker in the [facility].    CARDIOLOGY CLINIC PROGRESS NOTE    DOS: 7/10/2018    Gonzalo Tucker  : 1950, 67 year old  MRN: [id number]      History:   I had the pleasure of meeting Gonzalo Tucker today in the cardiology clinic.  He is patient of Dr. Baldwin.     Gonzalo is a pleasant 67-year-old man with hypertension, coronary artery disease, and dyslipidemia.        He has a longstanding history of hypertension.  He noted that over the last year, his systolic blood pressures had averaged about 150 mmHg.  At that point, Dr. Baldwin began to make changes in his medical regimen.  He was switched from metoprolol to carvedilol.   He continued on maximal doses of irbesartan and hydrochlorothiazide.      He saw Dr. Baldwin 18.  His BPs were still running in the 150 range at times.  Dr. Baldwin increased carvedilol from 6.25 mg BID to 12.5 mg BID.      He presents today for follow up.   Feels ok with the increase in Coreg. No major fatigue, LH, dizziness, near syncope.   BPs at home ranging from 128-155/68-88, average is 140/76.  This is better, but again, he has SBPs into the 150s and this concerns him.   HRs mid 50s-mid 60s.   He did bring in his machine, and we checked his machine to both his left and right arms manually. All were very similar, see below.     ROS:  Skin:  Negative     Eyes:  Positive for glasses;cataracts  ENT:  Negative    Respiratory:  Negative    Cardiovascular:  Negative    Gastroenterology: Negative    Genitourinary:  Negative    Musculoskeletal:  Negative    Neurologic:  Negative    Psychiatric:  Negative    Heme/Lymph/Imm:  Negative    Endocrine:  Negative      PAST MEDICAL HISTORY:  Past Medical History:   Diagnosis Date     Angina pectoris (H)      CAD (coronary  artery disease)     nonSTEMI, 2007 cath - JASON LAD     HTN (hypertension)      Hyperlipidemia      MI, old     nonSTEMI       PAST SURGICAL HISTORY:  History reviewed. No pertinent surgical history.    SOCIAL HISTORY:  Social History     Social History     Marital status:      Spouse name: N/A     Number of children: N/A     Years of education: N/A     Social History Main Topics     Smoking status: Never Smoker     Smokeless tobacco: Never Used     Alcohol use 0.0 oz/week     0 Standard drinks or equivalent per week      Comment: 2 beers a week     Drug use: No     Sexual activity: Not Asked     Other Topics Concern     Caffeine Concern Yes     3 cups of coffee a day.  8 oz diet coke     Sleep Concern No     nocturia     Stress Concern No     Weight Concern No     Special Diet Yes     low fat. no fried foods. whole wheat     Exercise Yes     30 minutes a day walking, cycling     Seat Belt Yes     Social History Narrative       FAMILY HISTORY:  Family History   Problem Relation Age of Onset     HEART DISEASE Mother       from heart attack     Alzheimer Disease Mother      Diabetes Father      HEART DISEASE Father      HEART DISEASE Paternal Grandmother      HEART DISEASE Paternal Grandfather      Breast Cancer Sister 50     Other - See Comments Brother      AVM - atririal venous malfunction       MEDS:   Current Outpatient Prescriptions on File Prior to Visit:  Acetaminophen (TYLENOL 8 HOUR PO)    aspirin 81 MG tablet Take 81 mg by mouth daily   atorvastatin (LIPITOR) 80 MG tablet Take 1 tablet (80 mg) by mouth daily   carvedilol (COREG) 6.25 MG tablet Take 2 tablets (12.5 mg) by mouth 2 times daily (with meals)   diphenhydrAMINE-APAP, sleep, (TYLENOL PM EXTRA STRENGTH)  MG/30ML LIQD    hydrochlorothiazide 12.5 MG TABS tablet Take 2 tablets (25 mg) by mouth daily   Ibuprofen-Diphenhydramine Cit (ADVIL PM) 200-38 MG TABS Take by mouth every evening as needed   irbesartan (AVAPRO) 300 MG  "tablet Take 1 tablet (300 mg) by mouth At Bedtime   Multiple vitamin TABS Take by mouth daily    tamsulosin (FLOMAX) 0.4 MG capsule Take by mouth daily   [DISCONTINUED] nitroGLYcerin (NITROSTAT) 0.4 MG sublingual tablet Place 1 tablet (0.4 mg) under the tongue every 5 minutes as needed for chest pain     No current facility-administered medications on file prior to visit.     ALLERGIES:   Allergies   Allergen Reactions     Penicillins Rash       PHYSICAL EXAM:  Vitals: /84 (BP Location: Right arm, Patient Position: Sitting)  Pulse 60  Ht 1.803 m (5' 11\")  Wt 81 kg (178 lb 8 oz)  SpO2 97%  BMI 24.9 kg/m2     Left arm manual 132/78  Right arm home machine 134/84  Right arm manual 132/88    Constitutional:  cooperative, alert and oriented, well developed, well nourished, in no acute distress        Skin:  warm and dry to the touch, no apparent skin lesions or masses noted        Head:  normocephalic, no masses or lesions        Eyes:  pupils equal and round;conjunctivae and lids unremarkable;sclera white;no xanthalasma        ENT:  no pallor or cyanosis, dentition good        Neck:  JVP normal        Respiratory:  normal breath sounds, clear to auscultation, normal A-P diameter, normal symmetry, normal respiratory excursion, no use of accessory muscles        Cardiac: regular rhythm;normal S1 and S2       systolic ejection murmur          GI:  abdomen soft;BS normoactive;non-tender        Vascular: pulses full and equal                                      Extremities and Musculoskeletal:  no deformities, clubbing, cyanosis, erythema observed;no edema        Neurological:  no gross motor deficits;affect appropriate            ASSESSMENT/PLAN:  1.  Coronary artery disease:   A.  Non-ST segment elevation MI in 2007.   B.  History of implantation of 3.5 x 38 mm length Sirolimus-Eluting Cypher stent in the LAD.  No significant narrowing in dominant right coronary, left main or circumflex.  Negative nuclear " stress test 2011.   C.  No anginal sxs.   D.  Continue ASA, high intensity statin.     2.  Hypertension.    A.  BPs have been consistently < 140/90 here in the clinic, but at home, they are averaging 140/78 and are not infrequently into the 150 range.   B.  We will continue carvedilol to 12.5 mg BID, irbesartan 300 mg daily, hydrochlorothiazide 25 mg daily.  I should note he is also on tamsulosin 0.4 mg for BPH.   C.  Will get a 24 Hour ambulatory BP monitor to better assess his BP.   D.  Follow up after monitor to review results. We may add in an additional agent, perhaps amlodipine.      3.  Dyslipidemia.    A.  Continue atorvastatin 80 mg. Last LDL 12/26/17 was 84.         Follow up:  After monitor.     Carlos Mosquera PA-C    Thank you for allowing me to participate in the care of your patient.      Sincerely,     Carlos Mosquera PA-C     [facility]    cc:   Kuldeep Baldwin MD  [address]  [place] [address]

## 2018-07-10 NOTE — LETTER
7/10/2018    Regan Archuleta MD  [facility] [address] [facility] [address]    RE: Gonzalo Tucker       Dear Colleague,    I had the pleasure of seeing Gonzalo Tucker in the [facility].    CARDIOLOGY CLINIC PROGRESS NOTE    DOS: 7/10/2018    Gonzalo Tucker  : 1950, 67 year old  MRN: [id number]      History:   I had the pleasure of meeting Gonzalo Tucker today in the cardiology clinic.  He is patient of Dr. Baldwin.     Gonzalo is a pleasant 67-year-old man with hypertension, coronary artery disease, and dyslipidemia.        He has a longstanding history of hypertension.  He noted that over the last year, his systolic blood pressures had averaged about 150 mmHg.  At that point, Dr. Baldwin began to make changes in his medical regimen.  He was switched from metoprolol to carvedilol.   He continued on maximal doses of irbesartan and hydrochlorothiazide.      He saw Dr. Baldwin 18.  His BPs were still running in the 150 range at times.  Dr. Baldwin increased carvedilol from 6.25 mg BID to 12.5 mg BID.      He presents today for follow up.   Feels ok with the increase in Coreg. No major fatigue, LH, dizziness, near syncope.   BPs at home ranging from 128-155/68-88, average is 140/76.  This is better, but again, he has SBPs into the 150s and this concerns him.   HRs mid 50s-mid 60s.   He did bring in his machine, and we checked his machine to both his left and right arms manually. All were very similar, see below.     ROS:  Skin:  Negative     Eyes:  Positive for glasses;cataracts  ENT:  Negative    Respiratory:  Negative    Cardiovascular:  Negative    Gastroenterology: Negative    Genitourinary:  Negative    Musculoskeletal:  Negative    Neurologic:  Negative    Psychiatric:  Negative    Heme/Lymph/Imm:  Negative    Endocrine:  Negative      PAST MEDICAL HISTORY:  Past Medical History:   Diagnosis Date     Angina pectoris (H)      CAD (coronary  artery disease)     nonSTEMI, 2007 cath - JASON LAD     HTN (hypertension)      Hyperlipidemia      MI, old     nonSTEMI       PAST SURGICAL HISTORY:  History reviewed. No pertinent surgical history.    SOCIAL HISTORY:  Social History     Social History     Marital status:      Spouse name: N/A     Number of children: N/A     Years of education: N/A     Social History Main Topics     Smoking status: Never Smoker     Smokeless tobacco: Never Used     Alcohol use 0.0 oz/week     0 Standard drinks or equivalent per week      Comment: 2 beers a week     Drug use: No     Sexual activity: Not Asked     Other Topics Concern     Caffeine Concern Yes     3 cups of coffee a day.  8 oz diet coke     Sleep Concern No     nocturia     Stress Concern No     Weight Concern No     Special Diet Yes     low fat. no fried foods. whole wheat     Exercise Yes     30 minutes a day walking, cycling     Seat Belt Yes     Social History Narrative       FAMILY HISTORY:  Family History   Problem Relation Age of Onset     HEART DISEASE Mother       from heart attack     Alzheimer Disease Mother      Diabetes Father      HEART DISEASE Father      HEART DISEASE Paternal Grandmother      HEART DISEASE Paternal Grandfather      Breast Cancer Sister 50     Other - See Comments Brother      AVM - atririal venous malfunction       MEDS:   Current Outpatient Prescriptions on File Prior to Visit:  Acetaminophen (TYLENOL 8 HOUR PO)    aspirin 81 MG tablet Take 81 mg by mouth daily   atorvastatin (LIPITOR) 80 MG tablet Take 1 tablet (80 mg) by mouth daily   carvedilol (COREG) 6.25 MG tablet Take 2 tablets (12.5 mg) by mouth 2 times daily (with meals)   diphenhydrAMINE-APAP, sleep, (TYLENOL PM EXTRA STRENGTH)  MG/30ML LIQD    hydrochlorothiazide 12.5 MG TABS tablet Take 2 tablets (25 mg) by mouth daily   Ibuprofen-Diphenhydramine Cit (ADVIL PM) 200-38 MG TABS Take by mouth every evening as needed   irbesartan (AVAPRO) 300 MG  "tablet Take 1 tablet (300 mg) by mouth At Bedtime   Multiple vitamin TABS Take by mouth daily    tamsulosin (FLOMAX) 0.4 MG capsule Take by mouth daily   [DISCONTINUED] nitroGLYcerin (NITROSTAT) 0.4 MG sublingual tablet Place 1 tablet (0.4 mg) under the tongue every 5 minutes as needed for chest pain     No current facility-administered medications on file prior to visit.     ALLERGIES:   Allergies   Allergen Reactions     Penicillins Rash       PHYSICAL EXAM:  Vitals: /84 (BP Location: Right arm, Patient Position: Sitting)  Pulse 60  Ht 1.803 m (5' 11\")  Wt 81 kg (178 lb 8 oz)  SpO2 97%  BMI 24.9 kg/m2     Left arm manual 132/78  Right arm home machine 134/84  Right arm manual 132/88    Constitutional:  cooperative, alert and oriented, well developed, well nourished, in no acute distress        Skin:  warm and dry to the touch, no apparent skin lesions or masses noted        Head:  normocephalic, no masses or lesions        Eyes:  pupils equal and round;conjunctivae and lids unremarkable;sclera white;no xanthalasma        ENT:  no pallor or cyanosis, dentition good        Neck:  JVP normal        Respiratory:  normal breath sounds, clear to auscultation, normal A-P diameter, normal symmetry, normal respiratory excursion, no use of accessory muscles        Cardiac: regular rhythm;normal S1 and S2       systolic ejection murmur          GI:  abdomen soft;BS normoactive;non-tender        Vascular: pulses full and equal                                      Extremities and Musculoskeletal:  no deformities, clubbing, cyanosis, erythema observed;no edema        Neurological:  no gross motor deficits;affect appropriate            ASSESSMENT/PLAN:  1.  Coronary artery disease:   A.  Non-ST segment elevation MI in 2007.   B.  History of implantation of 3.5 x 38 mm length Sirolimus-Eluting Cypher stent in the LAD.  No significant narrowing in dominant right coronary, left main or circumflex.  Negative nuclear " stress test 2011.   C.  No anginal sxs.   D.  Continue ASA, high intensity statin.     2.  Hypertension.    A.  BPs have been consistently < 140/90 here in the clinic, but at home, they are averaging 140/78 and are not infrequently into the 150 range.   B.  We will continue carvedilol to 12.5 mg BID, irbesartan 300 mg daily, hydrochlorothiazide 25 mg daily.  I should note he is also on tamsulosin 0.4 mg for BPH.   C.  Will get a 24 Hour ambulatory BP monitor to better assess his BP.   D.  Follow up after monitor to review results. We may add in an additional agent, perhaps amlodipine.      3.  Dyslipidemia.    A.  Continue atorvastatin 80 mg. Last LDL 12/26/17 was 84.         Follow up:  After monitor.       Thank you for allowing me to participate in the care of your patient.    Sincerely,     Carlos Mosquera PA-C     [facility]

## 2018-07-10 NOTE — MR AVS SNAPSHOT
After Visit Summary   7/10/2018    Gonzalo Tucker    MRN: [id number]           Patient Information     Date Of Birth          1950        Visit Information        Provider Department      7/10/2018 1:10 PM Carlos Mosquera PA-C [facility]        Today's Diagnoses     Elevated blood pressure, situational    -  1    Coronary artery disease involving native coronary artery of native heart without angina pectoris        Mixed hyperlipidemia        Benign essential hypertension          Care Instructions    Due to the continued controlled BPs here, and elevated at home, we will get an ambulatory BP monitor.   Follow up in about 1 month to review results.   We may add in another agent.             Follow-ups after your visit        Additional Services     Follow-Up with Cardiac Advanced Practice Provider                 Your next 10 appointments already scheduled     Jul 26, 2018  1:00 PM CDT   24 Hour Blood Pressure Monitor with [facility] BP PROC [facility] ([facility])    [address]  [facility] [id number] [phone]            Aug 09, 2018  2:30 PM CDT   Return Visit with Carlos Mosquera PA-C   [facility] ([facility])    [address]  [facility] [id number] [phone]              Future tests that were ordered for you today     Open Future Orders        Priority Expected Expires Ordered    Follow-Up with Cardiac Advanced Practice Provider Routine 8/9/2018 7/10/2019 7/10/2018    24 Hour Blood Pressure Monitor - Adult Routine 7/10/2018 8/24/2018 7/10/2018            Who to contact     If you have questions or need follow up information about today's clinic visit or your schedule please contact [facility] directly at [phone].  Normal or non-critical lab and imaging  "results will be communicated to you by MyChart, letter or phone within 4 business days after the clinic has received the results. If you do not hear from us within 7 days, please contact the clinic through MyChart or phone. If you have a critical or abnormal lab result, we will notify you by phone as soon as possible.  Submit refill requests through [employer] or call your pharmacy and they will forward the refill request to us. Please allow 3 business days for your refill to be completed.          Additional Information About Your Visit        Care EveryWhere ID     This is your Care EveryWhere ID. This could be used by other organizations to access your [facility] medical records  [id number]        Your Vitals Were     Pulse Height Pulse Oximetry BMI (Body Mass Index)          60 1.803 m (5' 11\") 97% 24.9 kg/m2         Blood Pressure from Last 3 Encounters:   07/10/18 134/84   05/31/18 133/83   03/19/18 128/80    Weight from Last 3 Encounters:   07/10/18 81 kg (178 lb 8 oz)   05/31/18 81.2 kg (179 lb)   03/19/18 81.2 kg (179 lb)              We Performed the Following     Follow-Up with Cardiac Advanced Practice Provider          Where to get your medicines      These medications were sent to [place] [address] [place] [address] [address]     Phone:  [phone]     nitroGLYcerin 0.4 MG sublingual tablet          Primary Care Provider Office Phone # Fax #    Regan Decker [phone]       [facility] [address] [facility] [address]        Equal Access to Services     ELOISA BARTON : Hadii michele ku hadasho Somitraali, waaxda luqadaha, qaybta kaalmada kimani renteria. So [facility] [phone].    ATENCIÓN: Si habla español, tiene a adler disposición servicios gratuitos de asistencia lingüística. Lauren al [phone].    We comply with applicable federal civil rights laws and [place] laws. We " do not discriminate on the basis of race, color, national origin, age, disability, sex, sexual orientation, or gender identity.            Thank you!     Thank you for choosing [facility]  for your care. Our goal is always to provide you with excellent care. Hearing back from our patients is one way we can continue to improve our services. Please take a few minutes to complete the written survey that you may receive in the mail after your visit with us. Thank you!             Your Updated Medication List - Protect others around you: Learn how to safely use, store and throw away your medicines at www.disposemymeds.org.          This list is accurate as of 7/10/18  2:02 PM.  Always use your most recent med list.                   Brand Name Dispense Instructions for use Diagnosis    ADVIL -38 MG Tabs   Generic drug:  Ibuprofen-Diphenhydramine Cit      Take by mouth every evening as needed        aspirin 81 MG tablet      Take 81 mg by mouth daily        atorvastatin 80 MG tablet    LIPITOR    90 tablet    Take 1 tablet (80 mg) by mouth daily    Coronary artery disease involving native coronary artery of native heart without angina pectoris, Mixed hyperlipidemia       carvedilol 6.25 MG tablet    COREG    60 tablet    Take 2 tablets (12.5 mg) by mouth 2 times daily (with meals)    Benign essential hypertension, Mixed hyperlipidemia, Coronary artery disease involving native coronary artery of native heart without angina pectoris       FLOMAX 0.4 MG capsule   Generic drug:  tamsulosin      Take by mouth daily        hydrochlorothiazide 12.5 MG Tabs tablet     60 tablet    Take 2 tablets (25 mg) by mouth daily    Coronary artery disease involving native coronary artery of native heart without angina pectoris, Mixed hyperlipidemia       irbesartan 300 MG tablet    AVAPRO    90 tablet    Take 1 tablet (300 mg) by mouth At Bedtime    Coronary artery disease involving native  coronary artery of native heart without angina pectoris       Multiple vitamin Tabs      Take by mouth daily        nitroGLYcerin 0.4 MG sublingual tablet    NITROSTAT    25 tablet    Place 1 tablet (0.4 mg) under the tongue every 5 minutes as needed for chest pain    Coronary artery disease involving native coronary artery of native heart without angina pectoris       TYLENOL 8 HOUR PO           TYLENOL PM EXTRA STRENGTH  MG/30ML Liqd   Generic drug:  diphenhydrAMINE-APAP (sleep)

## 2018-07-10 NOTE — PROGRESS NOTES
CARDIOLOGY CLINIC PROGRESS NOTE    DOS: 7/10/2018    Gonzalo Tucker  : 1950, 67 year old  MRN: [id number]      History:   I had the pleasure of meeting Gonzalo Tucker today in the cardiology clinic.  He is patient of Dr. Baldwin.     Gonzalo is a pleasant 67-year-old man with hypertension, coronary artery disease, and dyslipidemia.        He has a longstanding history of hypertension.  He noted that over the last year, his systolic blood pressures had averaged about 150 mmHg.  At that point, Dr. Baldwin began to make changes in his medical regimen.  He was switched from metoprolol to carvedilol.  He continued on maximal doses of irbesartan and hydrochlorothiazide.     He saw Dr. Baldwin 18.  His BPs were still running in the 150 range at times.  Dr. Baldwin increased carvedilol from 6.25 mg BID to 12.5 mg BID.      He presents today for follow up.   Feels ok with the increase in Coreg. No major fatigue, LH, dizziness, near syncope.   BPs at home ranging from 128-155/68-88, average is 140/76.  This is better, but again, he has SBPs into the 150s and this concerns him.   HRs mid 50s-mid 60s.   He did bring in his machine, and we checked his machine to both his left and right arms manually. All were very similar, see below.     ROS:  Skin:  Negative     Eyes:  Positive for glasses;cataracts  ENT:  Negative    Respiratory:  Negative    Cardiovascular:  Negative    Gastroenterology: Negative    Genitourinary:  Negative    Musculoskeletal:  Negative    Neurologic:  Negative    Psychiatric:  Negative    Heme/Lymph/Imm:  Negative    Endocrine:  Negative      PAST MEDICAL HISTORY:  Past Medical History:   Diagnosis Date     Angina pectoris (H)      CAD (coronary artery disease)     nonSTEMI, 2007 cath - [facility]     HTN (hypertension)      Hyperlipidemia      MI, old     nonSTEMI       PAST SURGICAL HISTORY:  History reviewed. No pertinent surgical history.    SOCIAL HISTORY:  Social History      Social History     Marital status:      Spouse name: N/A     Number of children: N/A     Years of education: N/A     Social History Main Topics     Smoking status: Never Smoker     Smokeless tobacco: Never Used     Alcohol use 0.0 oz/week     0 Standard drinks or equivalent per week      Comment: 2 beers a week     Drug use: No     Sexual activity: Not Asked     Other Topics Concern     Caffeine Concern Yes     3 cups of coffee a day.  8 oz diet coke     Sleep Concern No     nocturia     Stress Concern No     Weight Concern No     Special Diet Yes     low fat. no fried foods. whole wheat     Exercise Yes     30 minutes a day walking, cycling     Seat Belt Yes     Social History Narrative       FAMILY HISTORY:  Family History   Problem Relation Age of Onset     HEART DISEASE Mother       from heart attack     Alzheimer Disease Mother      Diabetes Father      HEART DISEASE Father      HEART DISEASE Paternal Grandmother      HEART DISEASE Paternal Grandfather      Breast Cancer Sister 50     Other - See Comments Brother      AVM - atririal venous malfunction       MEDS:   Current Outpatient Prescriptions on File Prior to Visit:  Acetaminophen (TYLENOL 8 HOUR PO)    aspirin 81 MG tablet Take 81 mg by mouth daily   atorvastatin (LIPITOR) 80 MG tablet Take 1 tablet (80 mg) by mouth daily   carvedilol (COREG) 6.25 MG tablet Take 2 tablets (12.5 mg) by mouth 2 times daily (with meals)   diphenhydrAMINE-APAP, sleep, (TYLENOL PM EXTRA STRENGTH)  MG/30ML LIQD    hydrochlorothiazide 12.5 MG TABS tablet Take 2 tablets (25 mg) by mouth daily   Ibuprofen-Diphenhydramine Cit (ADVIL PM) 200-38 MG TABS Take by mouth every evening as needed   irbesartan (AVAPRO) 300 MG tablet Take 1 tablet (300 mg) by mouth At Bedtime   Multiple vitamin TABS Take by mouth daily    tamsulosin (FLOMAX) 0.4 MG capsule Take by mouth daily   [DISCONTINUED] nitroGLYcerin (NITROSTAT) 0.4 MG sublingual tablet Place 1 tablet (0.4 mg)  "under the tongue every 5 minutes as needed for chest pain     No current facility-administered medications on file prior to visit.     ALLERGIES:   Allergies   Allergen Reactions     Penicillins Rash       PHYSICAL EXAM:  Vitals: /84 (BP Location: Right arm, Patient Position: Sitting)  Pulse 60  Ht 1.803 m (5' 11\")  Wt 81 kg (178 lb 8 oz)  SpO2 97%  BMI 24.9 kg/m2     Left arm manual 132/78  Right arm home machine 134/84  Right arm manual 132/88    Constitutional:  cooperative, alert and oriented, well developed, well nourished, in no acute distress        Skin:  warm and dry to the touch, no apparent skin lesions or masses noted        Head:  normocephalic, no masses or lesions        Eyes:  pupils equal and round;conjunctivae and lids unremarkable;sclera white;no xanthalasma        ENT:  no pallor or cyanosis, dentition good        Neck:  JVP normal        Respiratory:  normal breath sounds, clear to auscultation, normal A-P diameter, normal symmetry, normal respiratory excursion, no use of accessory muscles        Cardiac: regular rhythm;normal S1 and S2       systolic ejection murmur          GI:  abdomen soft;BS normoactive;non-tender        Vascular: pulses full and equal                                      Extremities and Musculoskeletal:  no deformities, clubbing, cyanosis, erythema observed;no edema        Neurological:  no gross motor deficits;affect appropriate            ASSESSMENT/PLAN:  1.  Coronary artery disease:   A.  Non-ST segment elevation MI in 2007.   B.  History of implantation of 3.5 x 38 mm length Sirolimus-Eluting Cypher stent in the LAD.  No significant narrowing in dominant right coronary, left main or circumflex.  Negative nuclear stress test 2011.   C.  No anginal sxs.   D.  Continue ASA, high intensity statin.     2.  Hypertension.   A.  BPs have been consistently < 140/90 here in the clinic, but at home, they are averaging 140/78 and are not infrequently into the 150 " range.   B.  We will continue carvedilol to 12.5 mg BID, irbesartan 300 mg daily, hydrochlorothiazide 25 mg daily.  I should note he is also on tamsulosin 0.4 mg for BPH.   C.  Will get a 24 Hour ambulatory BP monitor to better assess his BP.   D.  Follow up after monitor to review results. We may add in an additional agent, perhaps amlodipine.      3.  Dyslipidemia.   A.  Continue atorvastatin 80 mg. Last LDL 12/26/17 was 84.         Follow up:  After monitor.     Carlos Mosquera PA-C

## 2018-07-10 NOTE — PATIENT INSTRUCTIONS
Due to the continued controlled BPs here, and elevated at home, we will get an ambulatory BP monitor.   Follow up in about 1 month to review results.   We may add in another agent.

## 2018-07-24 DIAGNOSIS — I10 BENIGN ESSENTIAL HYPERTENSION: ICD-10-CM

## 2018-07-24 DIAGNOSIS — I25.10 CORONARY ARTERY DISEASE INVOLVING NATIVE CORONARY ARTERY OF NATIVE HEART WITHOUT ANGINA PECTORIS: ICD-10-CM

## 2018-07-24 DIAGNOSIS — E78.2 MIXED HYPERLIPIDEMIA: ICD-10-CM

## 2018-07-24 RX ORDER — CARVEDILOL 6.25 MG/1
12.5 TABLET ORAL 2 TIMES DAILY WITH MEALS
Qty: 360 TABLET | Refills: 3 | Status: SHIPPED | OUTPATIENT
Start: 2018-07-24 | End: 2019-05-06

## 2018-07-26 ENCOUNTER — HOSPITAL ENCOUNTER (OUTPATIENT)
Dept: CARDIOLOGY | Facility: CLINIC | Age: 68
Discharge: HOME | End: 2018-07-26
Attending: PHYSICIAN ASSISTANT | Admitting: PHYSICIAN ASSISTANT
Payer: MEDICARE

## 2018-07-26 DIAGNOSIS — R03.0 ELEVATED BLOOD PRESSURE, SITUATIONAL: ICD-10-CM

## 2018-07-26 PROCEDURE — 93788 AMBL BP MNTR W/SW A/R: CPT

## 2018-07-26 PROCEDURE — 93790 AMBL BP MNTR W/SW I&R: CPT | Performed by: INTERNAL MEDICINE

## 2018-08-06 ENCOUNTER — TELEPHONE (OUTPATIENT)
Dept: CARDIOLOGY | Facility: CLINIC | Age: 68
End: 2018-08-06

## 2018-08-06 NOTE — TELEPHONE ENCOUNTER
Received message from Dr. Baldwin below. Pt has an OV with CLARISSA Hicks this Thursday (8/9/18) to review the results.

## 2018-08-06 NOTE — TELEPHONE ENCOUNTER
----- Message from Kuldeep Baldwin MD sent at 8/4/2018  2:52 AM CDT -----  Regarding: ambulatory bps  His blood pressures appear very well controlled with ambulatory monitoring so I would not make any changes at this time. Thanks

## 2018-08-09 ENCOUNTER — OFFICE VISIT (OUTPATIENT)
Dept: CARDIOLOGY | Facility: CLINIC | Age: 68
End: 2018-08-09
Attending: PHYSICIAN ASSISTANT
Payer: MEDICARE

## 2018-08-09 VITALS
WEIGHT: 177.2 LBS | SYSTOLIC BLOOD PRESSURE: 112 MMHG | DIASTOLIC BLOOD PRESSURE: 72 MMHG | BODY MASS INDEX: 24.81 KG/M2 | HEART RATE: 66 BPM | OXYGEN SATURATION: 97 % | HEIGHT: 71 IN

## 2018-08-09 DIAGNOSIS — R03.0 ELEVATED BLOOD PRESSURE, SITUATIONAL: ICD-10-CM

## 2018-08-09 DIAGNOSIS — I10 BENIGN ESSENTIAL HYPERTENSION: ICD-10-CM

## 2018-08-09 PROCEDURE — 99213 OFFICE O/P EST LOW 20 MIN: CPT | Performed by: PHYSICIAN ASSISTANT

## 2018-08-09 NOTE — LETTER
2018    Regan Archuleta MD  [facility] [address] [facility] [id number]    RE: Gonzalo Tucker       Dear Colleague,    I had the pleasure of seeing Gonzalo Tucker in the [facility].    CARDIOLOGY CLINIC PROGRESS NOTE    DOS: 18    Gonzalo Tucker  : 1950, 68 year old  MRN: [id number]      History:   I had the pleasure of following up with Gonzalo Tucker today regarding his blood pressure.  He is patient of Dr. Baldwin.     Gonzalo is a pleasant 68-year-old man with hypertension, coronary artery disease, and dyslipidemia.        He has a longstanding history of hypertension.  He noted that over the last year, his systolic blood pressures had averaged about 150 mmHg.  At that point, Dr. Baldwin began to make changes in his medical regimen.  He was switched from metoprolol to carvedilol.  He continued on maximal doses of irbesartan and hydrochlorothiazide.     He saw Dr. Baldwin 18.  His BPs were still running in the 150 range at times.  Dr. Baldwin increased carvedilol from 6.25 mg BID to 12.5 mg BID.      In follow up, he was concerned with elevated readings at home. We did compare his monitor to our cuff, and it was accurate.  Therefore he wore a 24 Hour Ambulatory monitor.  This showed excellent BP control.  97% of the time BP controlled. Average was 116/70.     He presents today for follow up.   He is pleased with the numbers.   No cardiac concerns.   He is tolerating his meds.      ROS:  Skin:  Negative     Eyes:  Positive for glasses;cataracts  ENT:  Negative    Respiratory:  Negative    Cardiovascular:  Negative    Gastroenterology: Negative    Genitourinary:  Positive for nocturia;prostate problem;urinary frequency  Musculoskeletal:  Negative    Neurologic:  Negative    Psychiatric:  Positive for sleep disturbances  Heme/Lymph/Imm:  Positive for allergies  Endocrine:  Negative      PAST MEDICAL HISTORY:  Past Medical History:    Diagnosis Date     Angina pectoris (H)      CAD (coronary artery disease)     nonSTEMI, 2007 cath - [facility]     HTN (hypertension)      Hyperlipidemia      MI, old     nonSTEMI       PAST SURGICAL HISTORY:  No past surgical history on file.    SOCIAL HISTORY:  Social History     Social History     Marital status:      Spouse name: N/A     Number of children: N/A     Years of education: N/A     Social History Main Topics     Smoking status: Never Smoker     Smokeless tobacco: Never Used     Alcohol use 0.0 oz/week     0 Standard drinks or equivalent per week      Comment: 2 beers a week     Drug use: No     Sexual activity: Not Asked     Other Topics Concern     Caffeine Concern Yes     3 cups of coffee a day.  8 oz diet coke     Sleep Concern No     nocturia     Stress Concern No     Weight Concern No     Special Diet Yes     low fat. no fried foods. whole wheat     Exercise Yes     30 minutes a day walking, cycling     Seat Belt Yes     Social History Narrative       FAMILY HISTORY:  Family History   Problem Relation Age of Onset     HEART DISEASE Mother       from heart attack     Alzheimer Disease Mother      Diabetes Father      HEART DISEASE Father      HEART DISEASE Paternal Grandmother      HEART DISEASE Paternal Grandfather      Breast Cancer Sister 50     Other - See Comments Brother      AVM - atririal venous malfunction       MEDS:   Current Outpatient Prescriptions on File Prior to Visit:  Acetaminophen (TYLENOL 8 HOUR PO) Take by mouth as needed    aspirin 81 MG tablet Take 81 mg by mouth daily   atorvastatin (LIPITOR) 80 MG tablet Take 1 tablet (80 mg) by mouth daily   carvedilol (COREG) 6.25 MG tablet Take 2 tablets (12.5 mg) by mouth 2 times daily (with meals)   diphenhydrAMINE-APAP, sleep, (TYLENOL PM EXTRA STRENGTH)  MG/30ML LIQD Take by mouth as needed    hydrochlorothiazide 12.5 MG TABS tablet Take 2 tablets (25 mg) by mouth daily   Ibuprofen-Diphenhydramine Cit  "(ADVIL PM) 200-38 MG TABS Take by mouth every evening as needed   irbesartan (AVAPRO) 300 MG tablet Take 1 tablet (300 mg) by mouth At Bedtime   Multiple vitamin TABS Take by mouth daily    nitroGLYcerin (NITROSTAT) 0.4 MG sublingual tablet Place 1 tablet (0.4 mg) under the tongue every 5 minutes as needed for chest pain   tamsulosin (FLOMAX) 0.4 MG capsule Take by mouth daily     No current facility-administered medications on file prior to visit.     ALLERGIES:   Allergies   Allergen Reactions     Penicillins Rash       PHYSICAL EXAM:  Vitals: /72 (BP Location: Right arm, Patient Position: Sitting, Cuff Size: Adult Regular)  Pulse 66  Ht 1.803 m (5' 11\")  Wt 80.4 kg (177 lb 3.2 oz)  SpO2 97%  BMI 24.71 kg/m2       Constitutional:  cooperative, alert and oriented, well developed, well nourished, in no acute distress        Skin:  warm and dry to the touch, no apparent skin lesions or masses noted        Head:  normocephalic, no masses or lesions        Eyes:  pupils equal and round;conjunctivae and lids unremarkable;sclera white;no xanthalasma        ENT:  no pallor or cyanosis, dentition good        Neck:  JVP normal        Respiratory:  normal breath sounds, clear to auscultation, normal A-P diameter, normal symmetry, normal respiratory excursion, no use of accessory muscles        Cardiac: regular rhythm;normal S1 and S2       systolic ejection murmur          GI:  abdomen soft;BS normoactive;non-tender        Vascular: pulses full and equal                                      Extremities and Musculoskeletal:  no deformities, clubbing, cyanosis, erythema observed;no edema        Neurological:  no gross motor deficits;affect appropriate            ASSESSMENT/PLAN:  1.  Coronary artery disease:   A.  Non-ST segment elevation MI in 2007.   B.  History of implantation of 3.5 x 38 mm length Sirolimus-Eluting Cypher stent in the LAD.  No significant narrowing in dominant right coronary, left main or " circumflex.  Negative nuclear stress test 2011.   C.  No anginal sxs.   D.  Continue ASA, high intensity statin.     2.  Hypertension.   A.  BPs have been consistently < 140/90 here in the clinic, but at home, they are averaging 140/78 and are not infrequently into the 150 range.   B.  24 Hr Ambulatory monitor showed average 116/70, indicating well controlled BP  C.  Continue carvedilol to 12.5 mg BID, irbesartan 300 mg daily, hydrochlorothiazide 25 mg daily.  I should note he is also on tamsulosin 0.4 mg for BPH.     3.  Dyslipidemia.   A.  Continue atorvastatin 80 mg. Last LDL 12/26/17 was 84.         Follow up:  Dr. Baldwin in May 2019       Thank you for allowing me to participate in the care of your patient.    Sincerely,     Carlos Mosquera PA-C     [facility]

## 2018-08-09 NOTE — PROGRESS NOTES
CARDIOLOGY CLINIC PROGRESS NOTE    DOS: 18    Gonzalo Tucker  : 1950, 68 year old  MRN: [id number]      History:   I had the pleasure of following up with Gonzalo Tucker today regarding his blood pressure.  He is patient of Dr. Baldwin.     Gonzalo is a pleasant 68-year-old man with hypertension, coronary artery disease, and dyslipidemia.        He has a longstanding history of hypertension.  He noted that over the last year, his systolic blood pressures had averaged about 150 mmHg.  At that point, Dr. Baldwin began to make changes in his medical regimen.  He was switched from metoprolol to carvedilol.  He continued on maximal doses of irbesartan and hydrochlorothiazide.     He saw Dr. Baldwin 18.  His BPs were still running in the 150 range at times.  Dr. Baldwin increased carvedilol from 6.25 mg BID to 12.5 mg BID.      In follow up, he was concerned with elevated readings at home. We did compare his monitor to our cuff, and it was accurate.  Therefore he wore a 24 Hour Ambulatory monitor.  This showed excellent BP control.  97% of the time BP controlled. Average was 116/70.     He presents today for follow up.   He is pleased with the numbers.   No cardiac concerns.   He is tolerating his meds.      ROS:  Skin:  Negative     Eyes:  Positive for glasses;cataracts  ENT:  Negative    Respiratory:  Negative    Cardiovascular:  Negative    Gastroenterology: Negative    Genitourinary:  Positive for nocturia;prostate problem;urinary frequency  Musculoskeletal:  Negative    Neurologic:  Negative    Psychiatric:  Positive for sleep disturbances  Heme/Lymph/Imm:  Positive for allergies  Endocrine:  Negative      PAST MEDICAL HISTORY:  Past Medical History:   Diagnosis Date     Angina pectoris (H)      CAD (coronary artery disease)     nonSTEMI, 2007 cath - [facility]     HTN (hypertension)      Hyperlipidemia      MI, old     nonSTEMI       PAST SURGICAL HISTORY:  No past surgical history on  file.    SOCIAL HISTORY:  Social History     Social History     Marital status:      Spouse name: N/A     Number of children: N/A     Years of education: N/A     Social History Main Topics     Smoking status: Never Smoker     Smokeless tobacco: Never Used     Alcohol use 0.0 oz/week     0 Standard drinks or equivalent per week      Comment: 2 beers a week     Drug use: No     Sexual activity: Not Asked     Other Topics Concern     Caffeine Concern Yes     3 cups of coffee a day.  8 oz diet coke     Sleep Concern No     nocturia     Stress Concern No     Weight Concern No     Special Diet Yes     low fat. no fried foods. whole wheat     Exercise Yes     30 minutes a day walking, cycling     Seat Belt Yes     Social History Narrative       FAMILY HISTORY:  Family History   Problem Relation Age of Onset     HEART DISEASE Mother       from heart attack     Alzheimer Disease Mother      Diabetes Father      HEART DISEASE Father      HEART DISEASE Paternal Grandmother      HEART DISEASE Paternal Grandfather      Breast Cancer Sister 50     Other - See Comments Brother      AVM - atririal venous malfunction       MEDS:   Current Outpatient Prescriptions on File Prior to Visit:  Acetaminophen (TYLENOL 8 HOUR PO) Take by mouth as needed    aspirin 81 MG tablet Take 81 mg by mouth daily   atorvastatin (LIPITOR) 80 MG tablet Take 1 tablet (80 mg) by mouth daily   carvedilol (COREG) 6.25 MG tablet Take 2 tablets (12.5 mg) by mouth 2 times daily (with meals)   diphenhydrAMINE-APAP, sleep, (TYLENOL PM EXTRA STRENGTH)  MG/30ML LIQD Take by mouth as needed    hydrochlorothiazide 12.5 MG TABS tablet Take 2 tablets (25 mg) by mouth daily   Ibuprofen-Diphenhydramine Cit (ADVIL PM) 200-38 MG TABS Take by mouth every evening as needed   irbesartan (AVAPRO) 300 MG tablet Take 1 tablet (300 mg) by mouth At Bedtime   Multiple vitamin TABS Take by mouth daily    nitroGLYcerin (NITROSTAT) 0.4 MG sublingual tablet Place 1  "tablet (0.4 mg) under the tongue every 5 minutes as needed for chest pain   tamsulosin (FLOMAX) 0.4 MG capsule Take by mouth daily     No current facility-administered medications on file prior to visit.     ALLERGIES:   Allergies   Allergen Reactions     Penicillins Rash       PHYSICAL EXAM:  Vitals: /72 (BP Location: Right arm, Patient Position: Sitting, Cuff Size: Adult Regular)  Pulse 66  Ht 1.803 m (5' 11\")  Wt 80.4 kg (177 lb 3.2 oz)  SpO2 97%  BMI 24.71 kg/m2       Constitutional:  cooperative, alert and oriented, well developed, well nourished, in no acute distress        Skin:  warm and dry to the touch, no apparent skin lesions or masses noted        Head:  normocephalic, no masses or lesions        Eyes:  pupils equal and round;conjunctivae and lids unremarkable;sclera white;no xanthalasma        ENT:  no pallor or cyanosis, dentition good        Neck:  JVP normal        Respiratory:  normal breath sounds, clear to auscultation, normal A-P diameter, normal symmetry, normal respiratory excursion, no use of accessory muscles        Cardiac: regular rhythm;normal S1 and S2       systolic ejection murmur          GI:  abdomen soft;BS normoactive;non-tender        Vascular: pulses full and equal                                      Extremities and Musculoskeletal:  no deformities, clubbing, cyanosis, erythema observed;no edema        Neurological:  no gross motor deficits;affect appropriate            ASSESSMENT/PLAN:  1.  Coronary artery disease:   A.  Non-ST segment elevation MI in 2007.   B.  History of implantation of 3.5 x 38 mm length Sirolimus-Eluting Cypher stent in the LAD.  No significant narrowing in dominant right coronary, left main or circumflex.  Negative nuclear stress test 2011.   C.  No anginal sxs.   D.  Continue ASA, high intensity statin.     2.  Hypertension.   A.  BPs have been consistently < 140/90 here in the clinic, but at home, they are averaging 140/78 and are not " infrequently into the 150 range.   B.  24 Hr Ambulatory monitor showed average 116/70, indicating well controlled BP  C.  Continue carvedilol to 12.5 mg BID, irbesartan 300 mg daily, hydrochlorothiazide 25 mg daily.  I should note he is also on tamsulosin 0.4 mg for BPH.     3.  Dyslipidemia.   A.  Continue atorvastatin 80 mg. Last LDL 12/26/17 was 84.         Follow up:  Dr. Baldwin in May 2019     Carlos Mosquera PA-C

## 2018-08-09 NOTE — MR AVS SNAPSHOT
"              After Visit Summary   8/9/2018    Gonzalo Tucker    MRN: [id number]           Patient Information     Date Of Birth          1950        Visit Information        Provider Department      8/9/2018 2:30 PM Carlos Mosquera PA-C [facility]        Today's Diagnoses     Benign essential hypertension        Elevated blood pressure, situational           Follow-ups after your visit        Additional Services     Follow-Up with Cardiologist                 Future tests that were ordered for you today     Open Future Orders        Priority Expected Expires Ordered    Follow-Up with Cardiologist Routine 5/6/2019 8/9/2019 8/9/2018            Who to contact     If you have questions or need follow up information about today's clinic visit or your schedule please contact [facility] directly at [phone].  Normal or non-critical lab and imaging results will be communicated to you by MyChart, letter or phone within 4 business days after the clinic has received the results. If you do not hear from us within 7 days, please contact the clinic through MyChart or phone. If you have a critical or abnormal lab result, we will notify you by phone as soon as possible.  Submit refill requests through [employer] or call your pharmacy and they will forward the refill request to us. Please allow 3 business days for your refill to be completed.          Additional Information About Your Visit        Care EveryWhere ID     This is your Care EveryWhere ID. This could be used by other organizations to access your [place] medical records  [id number]        Your Vitals Were     Pulse Height Pulse Oximetry BMI (Body Mass Index)          66 1.803 m (5' 11\") 97% 24.71 kg/m2         Blood Pressure from Last 3 Encounters:   08/09/18 112/72   07/10/18 134/84   05/31/18 133/83    Weight from Last 3 Encounters:   08/09/18 80.4 kg (177 lb 3.2 oz) "   07/10/18 81 kg (178 lb 8 oz)   05/31/18 81.2 kg (179 lb)              We Performed the Following     Follow-Up with Cardiac Advanced Practice Provider        Primary Care Provider Office Phone # Fax #    Regan Decker [phone]       [facility] [address] [address]        Equal Access to Services     ALEK BARTON : Hadii aad ku hadasho Soomaali, waaxda luqadaha, qaybta kaalmada adeegyada, waxay idiin hayaan adeeg kharash la'aan ah. So [facility] [phone].    ATENCIÓN: Si habla español, tiene a adler disposición servicios gratuitos de asistencia lingüística. Lauren al [phone].    We comply with applicable federal civil rights laws and [place] laws. We do not discriminate on the basis of race, color, national origin, age, disability, sex, sexual orientation, or gender identity.            Thank you!     Thank you for choosing [facility]  for your care. Our goal is always to provide you with excellent care. Hearing back from our patients is one way we can continue to improve our services. Please take a few minutes to complete the written survey that you may receive in the mail after your visit with us. Thank you!             Your Updated Medication List - Protect others around you: Learn how to safely use, store and throw away your medicines at www.disposemymeds.org.          This list is accurate as of 8/9/18  2:49 PM.  Always use your most recent med list.                   Brand Name Dispense Instructions for use Diagnosis    ADVIL -38 MG Tabs   Generic drug:  Ibuprofen-Diphenhydramine Cit      Take by mouth every evening as needed        aspirin 81 MG tablet      Take 81 mg by mouth daily        atorvastatin 80 MG tablet    LIPITOR    90 tablet    Take 1 tablet (80 mg) by mouth daily    Coronary artery disease involving native coronary artery of native heart without angina pectoris, Mixed hyperlipidemia        carvedilol 6.25 MG tablet    COREG    360 tablet    Take 2 tablets (12.5 mg) by mouth 2 times daily (with meals)    Benign essential hypertension, Mixed hyperlipidemia, Coronary artery disease involving native coronary artery of native heart without angina pectoris       FLOMAX 0.4 MG capsule   Generic drug:  tamsulosin      Take by mouth daily        hydrochlorothiazide 12.5 MG Tabs tablet     60 tablet    Take 2 tablets (25 mg) by mouth daily    Coronary artery disease involving native coronary artery of native heart without angina pectoris, Mixed hyperlipidemia       irbesartan 300 MG tablet    AVAPRO    90 tablet    Take 1 tablet (300 mg) by mouth At Bedtime    Coronary artery disease involving native coronary artery of native heart without angina pectoris       Multiple vitamin Tabs      Take by mouth daily        nitroGLYcerin 0.4 MG sublingual tablet    NITROSTAT    25 tablet    Place 1 tablet (0.4 mg) under the tongue every 5 minutes as needed for chest pain    Coronary artery disease involving native coronary artery of native heart without angina pectoris       TYLENOL 8 HOUR PO      Take by mouth as needed        TYLENOL PM EXTRA STRENGTH  MG/30ML Liqd   Generic drug:  diphenhydrAMINE-APAP (sleep)      Take by mouth as needed

## 2018-08-10 DIAGNOSIS — I25.10 CORONARY ARTERY DISEASE INVOLVING NATIVE CORONARY ARTERY OF NATIVE HEART WITHOUT ANGINA PECTORIS: ICD-10-CM

## 2018-08-10 RX ORDER — IRBESARTAN 300 MG/1
300 TABLET ORAL AT BEDTIME
Qty: 90 TABLET | Refills: 3 | Status: SHIPPED | OUTPATIENT
Start: 2018-08-10 | End: 2019-02-18

## 2019-02-18 DIAGNOSIS — I25.10 CORONARY ARTERY DISEASE INVOLVING NATIVE CORONARY ARTERY OF NATIVE HEART WITHOUT ANGINA PECTORIS: ICD-10-CM

## 2019-02-18 RX ORDER — IRBESARTAN 150 MG/1
300 TABLET ORAL AT BEDTIME
Qty: 180 TABLET | Refills: 3 | Status: SHIPPED | OUTPATIENT
Start: 2019-02-18 | End: 2019-05-06

## 2019-04-05 DIAGNOSIS — E78.2 MIXED HYPERLIPIDEMIA: ICD-10-CM

## 2019-04-05 DIAGNOSIS — I25.10 CORONARY ARTERY DISEASE INVOLVING NATIVE CORONARY ARTERY OF NATIVE HEART WITHOUT ANGINA PECTORIS: ICD-10-CM

## 2019-04-05 RX ORDER — HYDROCHLOROTHIAZIDE 12.5 MG/1
25 TABLET ORAL DAILY
Qty: 180 TABLET | Refills: 0 | Status: SHIPPED | OUTPATIENT
Start: 2019-04-05 | End: 2019-05-06

## 2019-05-06 ENCOUNTER — OFFICE VISIT (OUTPATIENT)
Dept: CARDIOLOGY | Facility: CLINIC | Age: 69
End: 2019-05-06
Payer: MEDICARE

## 2019-05-06 VITALS
HEART RATE: 66 BPM | BODY MASS INDEX: 25.23 KG/M2 | DIASTOLIC BLOOD PRESSURE: 70 MMHG | WEIGHT: 180.2 LBS | SYSTOLIC BLOOD PRESSURE: 129 MMHG | HEIGHT: 71 IN

## 2019-05-06 DIAGNOSIS — E78.2 MIXED HYPERLIPIDEMIA: ICD-10-CM

## 2019-05-06 DIAGNOSIS — I10 BENIGN ESSENTIAL HYPERTENSION: ICD-10-CM

## 2019-05-06 DIAGNOSIS — I25.10 CORONARY ARTERY DISEASE INVOLVING NATIVE CORONARY ARTERY OF NATIVE HEART WITHOUT ANGINA PECTORIS: Primary | ICD-10-CM

## 2019-05-06 PROCEDURE — 99214 OFFICE O/P EST MOD 30 MIN: CPT | Performed by: INTERNAL MEDICINE

## 2019-05-06 RX ORDER — IRBESARTAN 150 MG/1
300 TABLET ORAL AT BEDTIME
Qty: 180 TABLET | Refills: 3 | Status: SHIPPED | OUTPATIENT
Start: 2019-05-06 | End: 2020-05-26

## 2019-05-06 RX ORDER — ATORVASTATIN CALCIUM 80 MG/1
80 TABLET, FILM COATED ORAL DAILY
Qty: 90 TABLET | Refills: 0 | Status: SHIPPED | OUTPATIENT
Start: 2019-05-06 | End: 2019-08-28

## 2019-05-06 RX ORDER — CARVEDILOL 6.25 MG/1
12.5 TABLET ORAL 2 TIMES DAILY WITH MEALS
Qty: 360 TABLET | Refills: 3 | Status: SHIPPED | OUTPATIENT
Start: 2019-05-06 | End: 2020-05-26

## 2019-05-06 RX ORDER — NITROGLYCERIN 0.4 MG/1
0.4 TABLET SUBLINGUAL EVERY 5 MIN PRN
Qty: 25 TABLET | Refills: 12 | Status: SHIPPED | OUTPATIENT
Start: 2019-05-06 | End: 2021-11-01

## 2019-05-06 RX ORDER — HYDROCHLOROTHIAZIDE 12.5 MG/1
25 TABLET ORAL DAILY
Qty: 180 TABLET | Refills: 0 | Status: SHIPPED | OUTPATIENT
Start: 2019-05-06 | End: 2019-10-15 | Stop reason: DRUGHIGH

## 2019-05-06 ASSESSMENT — MIFFLIN-ST. JEOR: SCORE: 1619.51

## 2019-05-06 NOTE — PROGRESS NOTES
"HISTORY OF PRESENT ILLNESS:  Intermittent maculopapular since February. Responsive steroids and benadryl. Has not yet seen PMD. Has not checked bp with his machine recently, but elevated with deanna in [place].     Orders this Visit:  No orders of the defined types were placed in this encounter.    No orders of the defined types were placed in this encounter.    There are no discontinued medications.    Encounter Diagnosis   Name Primary?     Benign essential hypertension        CURRENT MEDICATIONS:  Current Outpatient Medications   Medication Sig Dispense Refill     Acetaminophen (TYLENOL 8 HOUR PO) Take by mouth as needed        aspirin 81 MG tablet Take 81 mg by mouth daily       atorvastatin (LIPITOR) 80 MG tablet Take 1 tablet (80 mg) by mouth daily 90 tablet 0     carvedilol (COREG) 6.25 MG tablet Take 2 tablets (12.5 mg) by mouth 2 times daily (with meals) 360 tablet 3     diphenhydrAMINE-APAP, sleep, (TYLENOL PM EXTRA STRENGTH)  MG/30ML LIQD Take by mouth as needed        hydrochlorothiazide (HYDRODIURIL) 12.5 MG tablet Take 2 tablets (25 mg) by mouth daily 180 tablet 0     Ibuprofen-Diphenhydramine Cit (ADVIL PM) 200-38 MG TABS Take by mouth every evening as needed       irbesartan (AVAPRO) 150 MG tablet Take 2 tablets (300 mg) by mouth At Bedtime 180 tablet 3     Multiple vitamin TABS Take by mouth daily        nitroGLYcerin (NITROSTAT) 0.4 MG sublingual tablet Place 1 tablet (0.4 mg) under the tongue every 5 minutes as needed for chest pain 25 tablet 12     tamsulosin (FLOMAX) 0.4 MG capsule Take by mouth daily         ALLERGIES     Allergies   Allergen Reactions     Penicillins Rash       PAST MEDICAL, SURGICAL, FAMILY, SOCIAL HISTORY:  History was reviewed and updated as needed, see medical record.    Review of Systems:  A 12-point review of systems was completed, see medical record for detailed review of systems information.    Physical Exam:  Vitals: /86   Pulse 66   Ht 1.803 m (5' 11\") "   Wt 81.7 kg (180 lb 3.2 oz)   BMI 25.13 kg/m      Constitutional:           Skin:           Head:           Eyes:           ENT:           Neck:           Chest:           Cardiac:                    Abdomen:           Vascular:                                        Extremities and Back:           Neurological:           ASSESSMENT:  BP appears  Well controlled in clinic. ? Rash is drug related or not       RECOMMENDATIONS:   Dermatology evaluation, monitor bp      Recent Lab Results:  LIPID RESULTS:  Lab Results   Component Value Date    CHOL 153 12/26/2017    HDL 37 (L) 12/26/2017    LDL 84 12/26/2017    TRIG 161 (H) 12/26/2017    CHOLHDLRATIO 3.1 11/11/2015       LIVER ENZYME RESULTS:  Lab Results   Component Value Date    AST 36 08/30/2016    ALT 43 12/02/2016       CBC RESULTS:  Lab Results   Component Value Date    WBC 9.7 08/30/2016    RBC 5.42 08/30/2016    HGB 15.4 08/30/2016    HCT 46.7 08/30/2016    MCV 86 08/30/2016    MCH 28.4 08/30/2016    MCHC 33.0 08/30/2016    RDW 13.4 08/30/2016     08/30/2016       BMP RESULTS:  Lab Results   Component Value Date     08/30/2016    POTASSIUM 4.1 08/30/2016    CHLORIDE 106 08/30/2016    CO2 28 08/30/2016    ANIONGAP 7 08/30/2016     (H) 08/30/2016    BUN 22 08/30/2016    CR 0.91 08/30/2016    GFRESTIMATED 84 08/30/2016    GFRESTBLACK >90   GFR Calc   08/30/2016    ESPERANZA 8.8 08/30/2016        A1C RESULTS:  No results found for: A1C    INR RESULTS:  Lab Results   Component Value Date    INR 1.04 02/10/2008    INR 0.97 08/25/2007       We greatly appreciate the opportunity to be involved in the care of your patient, Gonzalo Tucker.    Sincerely,  Kuldeep Baldwin MD      CC  Carlos Mosquera, PA-C  [address] [place] [address]

## 2019-05-06 NOTE — PROGRESS NOTES
Service Date: 2019      Regan Decker MD   [facility]   [address] [place] [address]      RE: Gonzalo Tucker   MRN: [id number]   : 1950      Dear Dr. Decker:      Gonzalo Tucker, a 68-year-old man with coronary artery disease, dyslipidemia and primary (essential) hypertension was seen today at your request for followup.      Since 2019, Mr. Tucker has been troubled by an episodic  Pruritic, erythematous rash.  He describes 3 separate episodes of rash accompanied by pruritus that responded to diphenhydraminel and steroids provided by an urgent care MD in [place]. He has just returned from [place].     .  He has been on the current antihypertensive regimen since 2018 without any change and has no previous history of allergy.   He is scheduled to see his primary care physician in the near future.  He has not been checking his blood pressures at home, but his initial systolic pressure was 150mmHg in the clinic.  After having the patient sit and rest, a repeat systolic pressure was 129 mmHg.      The patient remains free of chest, arm, neck, jaw or back discomfort with exertion.  He exercises on a regular basis.  He has gained about 3 pounds since we last saw him.      PHYSICAL EXAMINATION:   GENERAL:  Exam today demonstrates a very pleasant and cooperative 68-year-old man.   VITAL SIGNS:  His blood pressure was 129/70, his heart rate was 66.  His height is 1.8 meters, his weight is 81.7 kg.  His BMI is 25.13.   LUNGS:  Clear to percussion and auscultation.   CARDIOVASCULAR:  Shows a normal S1 with a normal S2, there is no S3.  There is no murmur, rub or click.      Skin : He has a maculopapular erythematous rash involving his back and extensor surfaces.      ASSESSMENT:  Mr. Tucker's systolic blood pressure is adequately controlled at this time, but will require surveillance.  He would  benefit from a weight loss Mediterranean style diet with exercise.      I am uncertain  as to the cause of the patient's rash.  I cannot entirely exclude the possibility that the rash is related to his blood pressure medications,, although he has been on the same blood pressure medications for at least a year without problems..  I believe formal Dermatology evaluation is required in view of the recurrent and refractory nature of his rash. If necessary, we might need to change his blood pressure medications.       RECOMMENDATIONS:   1.  I have asked the patient to record blood pressures at home and notify us if they regularly exceed 130 mmHG.  2.  Mediterranean style weight loss diet.   3.  Regular exercise, 40 minutes 4-7 times a week.   4.  Formal Dermatology consultation to assess whether the patient's rash is related to any of his medications..  If necessary, we could consider using amlodipine and/or spironolactone as alternatives.   5.  Followup visit with me in about 1 year, earlier if any new questions or concerns.      We have appreciated the opportunity to care for your patient, Mr. Gonzalo Courtney.      Sincerely,         MD PRAVEEN Whittaker MD             D: 2019   T: 2019   MT: al      Name:     GONZALO COURTNEY   MRN:      [id number]        Account:      [id number]   :      1950           Service Date: 2019      Document: [id number]

## 2019-05-06 NOTE — LETTER
2019      Regan Decker MD  [facility] [address] [facility] [address]      RE: Gonzalo Tucker       Dear Colleague,    I had the pleasure of seeing Gonzalo Tucker in the [facility].    Service Date: 2019      Regan Decker MD   [facility]   [address] [place] [address]      RE: Gonzalo Tucker   MRN: [id number]   : 1950      Dear Dr. Decker:      Gonzalo Tucker, a 68-year-old man with coronary artery disease, dyslipidemia and primary (essential) hypertension was seen today at your request for followup.      Since 2019, Mr. Tucker has been troubled by an episodic  Pruritic, erythematous rash.  He describes 3 separate episodes of rash accompanied by pruritus that responded to diphenhydraminel and steroids provided by an urgent care MD in [place]. He has just returned from [place].     .  He has been on the current antihypertensive regimen since 2018 without any change and has no previous history of allergy.   He is scheduled to see his primary care physician in the near future.  He has not been checking his blood pressures at home, but his initial systolic pressure was 150mmHg in the clinic.  After having the patient sit and rest, a repeat systolic pressure was 129 mmHg.      The patient remains free of chest, arm, neck, jaw or back discomfort with exertion.  He exercises on a regular basis.  He has gained about 3 pounds since we last saw him.      PHYSICAL EXAMINATION:   GENERAL:  Exam today demonstrates a very pleasant and cooperative 68-year-old man.   VITAL SIGNS:  His blood pressure was 129/70, his heart rate was 66.  His height is 1.8 meters, his weight is 81.7 kg.  His BMI is 25.13.   LUNGS:  Clear to percussion and auscultation.   CARDIOVASCULAR:  Shows a normal S1 with a normal S2, there is no S3.  There is no murmur, rub or click.      Skin : He has a maculopapular erythematous rash involving his  back and extensor surfaces.      ASSESSMENT:  Mr. Courtney's systolic blood pressure is adequately controlled at this time, but will require surveillance.  He would  benefit from a weight loss Mediterranean style diet with exercise.      I am uncertain as to the cause of the patient's rash.  I cannot entirely exclude the possibility that the rash is related to his blood pressure medications,, although he has been on the same blood pressure medications for at least a year without problems..  I believe formal Dermatology evaluation is required in view of the recurrent and refractory nature of his rash. If necessary, we might need to change his blood pressure medications.       RECOMMENDATIONS:   1.  I have asked the patient to record blood pressures at home and notify us if they regularly exceed 130 mmHG.  2.  Mediterranean style weight loss diet.   3.  Regular exercise, 40 minutes 4-7 times a week.   4.  Formal Dermatology consultation to assess whether the patient's rash is related to any of his medications..  If necessary, we could consider using amlodipine and/or spironolactone as alternatives.   5.  Followup visit with me in about 1 year, earlier if any new questions or concerns.      We have appreciated the opportunity to care for your patient, Mr. Gonzalo Courtney.      Sincerely,         MD PRAVEEN Whittaker MD             D: 2019   T: 2019   MT: al      Name:     GONZALO COURTNEY   MRN:      [id number]        Account:      [id number]   :      1950           Service Date: 2019      Document: [id number]           Outpatient Encounter Medications as of 2019   Medication Sig Dispense Refill     Acetaminophen (TYLENOL 8 HOUR PO) Take by mouth as needed        aspirin 81 MG tablet Take 81 mg by mouth daily       atorvastatin (LIPITOR) 80 MG tablet Take 1 tablet (80 mg) by mouth daily 90 tablet 0     carvedilol (COREG) 6.25 MG tablet Take 2 tablets (12.5 mg) by  mouth 2 times daily (with meals) 360 tablet 3     diphenhydrAMINE-APAP, sleep, (TYLENOL PM EXTRA STRENGTH)  MG/30ML LIQD Take by mouth as needed        hydrochlorothiazide (HYDRODIURIL) 12.5 MG tablet Take 2 tablets (25 mg) by mouth daily 180 tablet 0     Ibuprofen-Diphenhydramine Cit (ADVIL PM) 200-38 MG TABS Take by mouth every evening as needed       irbesartan (AVAPRO) 150 MG tablet Take 2 tablets (300 mg) by mouth At Bedtime 180 tablet 3     Multiple vitamin TABS Take by mouth daily        nitroGLYcerin (NITROSTAT) 0.4 MG sublingual tablet Place 1 tablet (0.4 mg) under the tongue every 5 minutes as needed for chest pain 25 tablet 12     tamsulosin (FLOMAX) 0.4 MG capsule Take by mouth daily       [DISCONTINUED] atorvastatin (LIPITOR) 80 MG tablet Take 1 tablet (80 mg) by mouth daily 90 tablet 0     [DISCONTINUED] carvedilol (COREG) 6.25 MG tablet Take 2 tablets (12.5 mg) by mouth 2 times daily (with meals) 360 tablet 3     [DISCONTINUED] hydrochlorothiazide (HYDRODIURIL) 12.5 MG tablet Take 2 tablets (25 mg) by mouth daily 180 tablet 0     [DISCONTINUED] irbesartan (AVAPRO) 150 MG tablet Take 2 tablets (300 mg) by mouth At Bedtime 180 tablet 3     [DISCONTINUED] nitroGLYcerin (NITROSTAT) 0.4 MG sublingual tablet Place 1 tablet (0.4 mg) under the tongue every 5 minutes as needed for chest pain 25 tablet 12     No facility-administered encounter medications on file as of 5/6/2019.                Again, thank you for allowing me to participate in the care of your patient.      Sincerely,    Kuldeep Baldwin MD     [facility]

## 2019-08-28 DIAGNOSIS — I25.10 CORONARY ARTERY DISEASE INVOLVING NATIVE CORONARY ARTERY OF NATIVE HEART WITHOUT ANGINA PECTORIS: ICD-10-CM

## 2019-08-28 DIAGNOSIS — E78.2 MIXED HYPERLIPIDEMIA: ICD-10-CM

## 2019-08-28 RX ORDER — ATORVASTATIN CALCIUM 80 MG/1
80 TABLET, FILM COATED ORAL DAILY
Qty: 90 TABLET | Refills: 2 | Status: SHIPPED | OUTPATIENT
Start: 2019-08-28 | End: 2020-05-26

## 2019-08-28 NOTE — TELEPHONE ENCOUNTER
Received refill request for:  Atorvastatin  Last OV was: 5/6/2019 with Dr. Baldwin  Labs/EKG: last lipid 5/29/2019 at PMD - results in Care Everywhere  F/U scheduled: orders in Epic for 5/2020  New script sent to: [facility]

## 2019-10-15 DIAGNOSIS — I10 BENIGN ESSENTIAL HYPERTENSION: Primary | ICD-10-CM

## 2019-10-15 RX ORDER — HYDROCHLOROTHIAZIDE 25 MG/1
25 TABLET ORAL DAILY
Qty: 90 TABLET | Refills: 3 | Status: SHIPPED | OUTPATIENT
Start: 2019-10-15 | End: 2019-10-16

## 2019-10-16 DIAGNOSIS — I10 BENIGN ESSENTIAL HYPERTENSION: ICD-10-CM

## 2019-10-16 RX ORDER — HYDROCHLOROTHIAZIDE 25 MG/1
25 TABLET ORAL DAILY
Qty: 90 TABLET | Refills: 2 | Status: SHIPPED | OUTPATIENT
Start: 2019-10-16 | End: 2020-07-22

## 2019-12-26 ENCOUNTER — TELEPHONE (OUTPATIENT)
Dept: CARDIOLOGY | Facility: CLINIC | Age: 69
End: 2019-12-26

## 2019-12-26 ENCOUNTER — HOSPITAL ENCOUNTER (OUTPATIENT)
Facility: CLINIC | Age: 69
Setting detail: OBSERVATION
Discharge: HOME | End: 2019-12-27
Attending: EMERGENCY MEDICINE | Admitting: INTERNAL MEDICINE
Payer: MEDICARE

## 2019-12-26 ENCOUNTER — APPOINTMENT (OUTPATIENT)
Dept: GENERAL RADIOLOGY | Facility: CLINIC | Age: 69
End: 2019-12-26
Attending: EMERGENCY MEDICINE
Payer: MEDICARE

## 2019-12-26 DIAGNOSIS — R07.9 CHEST PAIN, UNSPECIFIED TYPE: ICD-10-CM

## 2019-12-26 LAB
ALBUMIN SERPL-MCNC: 4.1 G/DL (ref 3.4–5)
ALP SERPL-CCNC: 117 U/L (ref 40–150)
ALT SERPL W P-5'-P-CCNC: 50 U/L (ref 0–70)
ANION GAP SERPL CALCULATED.3IONS-SCNC: 3 MMOL/L (ref 3–14)
AST SERPL W P-5'-P-CCNC: 36 U/L (ref 0–45)
BASOPHILS # BLD AUTO: 0 10E9/L (ref 0–0.2)
BASOPHILS NFR BLD AUTO: 0 %
BILIRUB SERPL-MCNC: 0.9 MG/DL (ref 0.2–1.3)
BUN SERPL-MCNC: 41 MG/DL (ref 7–30)
CALCIUM SERPL-MCNC: 9.3 MG/DL (ref 8.5–10.1)
CHLORIDE SERPL-SCNC: 106 MMOL/L (ref 94–109)
CO2 SERPL-SCNC: 31 MMOL/L (ref 20–32)
CREAT SERPL-MCNC: 1.11 MG/DL (ref 0.66–1.25)
DIFFERENTIAL METHOD BLD: NORMAL
EOSINOPHIL # BLD AUTO: 0.5 10E9/L (ref 0–0.7)
EOSINOPHIL NFR BLD AUTO: 8.2 %
ERYTHROCYTE [DISTWIDTH] IN BLOOD BY AUTOMATED COUNT: 14.1 % (ref 10–15)
GFR SERPL CREATININE-BSD FRML MDRD: 68 ML/MIN/{1.73_M2}
GLUCOSE SERPL-MCNC: 89 MG/DL (ref 70–99)
HCT VFR BLD AUTO: 43.6 % (ref 40–53)
HGB BLD-MCNC: 14.6 G/DL (ref 13.3–17.7)
IMM GRANULOCYTES # BLD: 0 10E9/L (ref 0–0.4)
IMM GRANULOCYTES NFR BLD: 0.2 %
INTERPRETATION ECG - MUSE: NORMAL
LYMPHOCYTES # BLD AUTO: 1.3 10E9/L (ref 0.8–5.3)
LYMPHOCYTES NFR BLD AUTO: 20.9 %
MCH RBC QN AUTO: 29.1 PG (ref 26.5–33)
MCHC RBC AUTO-ENTMCNC: 33.5 G/DL (ref 31.5–36.5)
MCV RBC AUTO: 87 FL (ref 78–100)
MONOCYTES # BLD AUTO: 0.6 10E9/L (ref 0–1.3)
MONOCYTES NFR BLD AUTO: 9.6 %
NEUTROPHILS # BLD AUTO: 3.9 10E9/L (ref 1.6–8.3)
NEUTROPHILS NFR BLD AUTO: 61.1 %
NRBC # BLD AUTO: 0 10*3/UL
NRBC BLD AUTO-RTO: 0 /100
PLATELET # BLD AUTO: 182 10E9/L (ref 150–450)
POTASSIUM SERPL-SCNC: 4.2 MMOL/L (ref 3.4–5.3)
PROT SERPL-MCNC: 7.9 G/DL (ref 6.8–8.8)
RBC # BLD AUTO: 5.02 10E12/L (ref 4.4–5.9)
SODIUM SERPL-SCNC: 140 MMOL/L (ref 133–144)
TROPONIN I SERPL-MCNC: <0.015 UG/L (ref 0–0.04)
WBC # BLD AUTO: 6.3 10E9/L (ref 4–11)

## 2019-12-26 PROCEDURE — 84484 ASSAY OF TROPONIN QUANT: CPT | Mod: 91 | Performed by: INTERNAL MEDICINE

## 2019-12-26 PROCEDURE — 25000132 ZZH RX MED GY IP 250 OP 250 PS 637: Mod: GY | Performed by: INTERNAL MEDICINE

## 2019-12-26 PROCEDURE — 36415 COLL VENOUS BLD VENIPUNCTURE: CPT | Performed by: INTERNAL MEDICINE

## 2019-12-26 PROCEDURE — 93005 ELECTROCARDIOGRAM TRACING: CPT

## 2019-12-26 PROCEDURE — 85025 COMPLETE CBC W/AUTO DIFF WBC: CPT | Performed by: EMERGENCY MEDICINE

## 2019-12-26 PROCEDURE — 80053 COMPREHEN METABOLIC PANEL: CPT | Performed by: EMERGENCY MEDICINE

## 2019-12-26 PROCEDURE — 99220 ZZC INITIAL OBSERVATION CARE,LEVL III: CPT | Performed by: INTERNAL MEDICINE

## 2019-12-26 PROCEDURE — 99285 EMERGENCY DEPT VISIT HI MDM: CPT | Mod: 25

## 2019-12-26 PROCEDURE — G0378 HOSPITAL OBSERVATION PER HR: HCPCS

## 2019-12-26 PROCEDURE — 25000132 ZZH RX MED GY IP 250 OP 250 PS 637: Mod: GY | Performed by: EMERGENCY MEDICINE

## 2019-12-26 PROCEDURE — 71046 X-RAY EXAM CHEST 2 VIEWS: CPT

## 2019-12-26 PROCEDURE — 84484 ASSAY OF TROPONIN QUANT: CPT | Mod: 91 | Performed by: EMERGENCY MEDICINE

## 2019-12-26 RX ORDER — LIDOCAINE 40 MG/G
CREAM TOPICAL
Status: DISCONTINUED | OUTPATIENT
Start: 2019-12-26 | End: 2019-12-27 | Stop reason: HOSPADM

## 2019-12-26 RX ORDER — ASPIRIN 81 MG/1
162 TABLET, CHEWABLE ORAL ONCE
Status: COMPLETED | OUTPATIENT
Start: 2019-12-26 | End: 2019-12-26

## 2019-12-26 RX ORDER — ACETAMINOPHEN 325 MG/1
650 TABLET ORAL EVERY 4 HOURS PRN
Status: DISCONTINUED | OUTPATIENT
Start: 2019-12-26 | End: 2019-12-27 | Stop reason: HOSPADM

## 2019-12-26 RX ORDER — CETIRIZINE HYDROCHLORIDE 10 MG/1
10 TABLET ORAL 2 TIMES DAILY
COMMUNITY
End: 2023-08-10

## 2019-12-26 RX ORDER — ASPIRIN 81 MG/1
162 TABLET, CHEWABLE ORAL ONCE
Status: DISCONTINUED | OUTPATIENT
Start: 2019-12-26 | End: 2019-12-26

## 2019-12-26 RX ORDER — ASPIRIN 81 MG/1
81 TABLET ORAL DAILY
Status: DISCONTINUED | OUTPATIENT
Start: 2019-12-27 | End: 2019-12-26

## 2019-12-26 RX ORDER — CYCLOSPORINE 100 MG/1
100 CAPSULE, LIQUID FILLED ORAL 2 TIMES DAILY
COMMUNITY
End: 2023-08-10

## 2019-12-26 RX ORDER — HYDROCHLOROTHIAZIDE 25 MG/1
25 TABLET ORAL DAILY
Status: DISCONTINUED | OUTPATIENT
Start: 2019-12-27 | End: 2019-12-27 | Stop reason: HOSPADM

## 2019-12-26 RX ORDER — ASPIRIN 81 MG/1
81 TABLET ORAL EVERY EVENING
Status: DISCONTINUED | OUTPATIENT
Start: 2019-12-27 | End: 2019-12-27 | Stop reason: HOSPADM

## 2019-12-26 RX ORDER — NALOXONE HYDROCHLORIDE 0.4 MG/ML
.1-.4 INJECTION, SOLUTION INTRAMUSCULAR; INTRAVENOUS; SUBCUTANEOUS
Status: DISCONTINUED | OUTPATIENT
Start: 2019-12-26 | End: 2019-12-27 | Stop reason: HOSPADM

## 2019-12-26 RX ORDER — ACETAMINOPHEN 650 MG/1
650 SUPPOSITORY RECTAL EVERY 4 HOURS PRN
Status: DISCONTINUED | OUTPATIENT
Start: 2019-12-26 | End: 2019-12-27 | Stop reason: HOSPADM

## 2019-12-26 RX ORDER — MULTIPLE VITAMINS W/ MINERALS TAB 9MG-400MCG
1 TAB ORAL DAILY
COMMUNITY

## 2019-12-26 RX ORDER — CARVEDILOL 12.5 MG/1
12.5 TABLET ORAL 2 TIMES DAILY WITH MEALS
Status: DISCONTINUED | OUTPATIENT
Start: 2019-12-26 | End: 2019-12-27 | Stop reason: HOSPADM

## 2019-12-26 RX ORDER — IRBESARTAN 150 MG/1
300 TABLET ORAL AT BEDTIME
Status: DISCONTINUED | OUTPATIENT
Start: 2019-12-26 | End: 2019-12-27 | Stop reason: HOSPADM

## 2019-12-26 RX ORDER — NITROGLYCERIN 0.4 MG/1
0.4 TABLET SUBLINGUAL EVERY 5 MIN PRN
Status: DISCONTINUED | OUTPATIENT
Start: 2019-12-26 | End: 2019-12-27 | Stop reason: HOSPADM

## 2019-12-26 RX ORDER — ALUMINA, MAGNESIA, AND SIMETHICONE 2400; 2400; 240 MG/30ML; MG/30ML; MG/30ML
30 SUSPENSION ORAL EVERY 4 HOURS PRN
Status: DISCONTINUED | OUTPATIENT
Start: 2019-12-26 | End: 2019-12-27 | Stop reason: HOSPADM

## 2019-12-26 RX ADMIN — IRBESARTAN 300 MG: 150 TABLET ORAL at 22:19

## 2019-12-26 RX ADMIN — ASPIRIN 81 MG 162 MG: 81 TABLET ORAL at 19:39

## 2019-12-26 RX ADMIN — ACETAMINOPHEN 650 MG: 325 TABLET, FILM COATED ORAL at 22:18

## 2019-12-26 ASSESSMENT — ENCOUNTER SYMPTOMS
VOMITING: 0
NAUSEA: 0
SHORTNESS OF BREATH: 1

## 2019-12-26 NOTE — TELEPHONE ENCOUNTER
Kuldeep Baldwin MD  You 3 minutes ago (8:43 AM)      Thanks Rachel. I agree with your advice. Given his known history of CAD, he should undergo prompt evaluation for his chest pain. The symptoms may be unstable so ER is likely best place for him to go. Dr.M Lupillo lema      Called pt and reviewed recommendations per . Pt agrees with recommendations.  COLETTE North

## 2019-12-26 NOTE — ED PROVIDER NOTES
History     Chief Complaint:  Chest Pain     HPI   Gonzalo Tucker is a 69 year old male with a history of CAD, hypertension, and hyperlipidemia on 81 mg Aspirin who presents accompanied by his wife for evaluation of chest pain. Approximately one month ago the patient started to develop intermittent episodes of chest pain. He feels that these episodes of chest pain have been getting more severe over time. Due to concern for this chest pain the patient called his cardiologist this morning and was advised to seek evaluation in the ED if his pain recurs. Today around noon the patient was walking around the [employer] when he started to develop recurrent chest pain with some associated shortness of breath. He rested on a bench shortly after his pain started and his pain resolved gradually with rest. He notes that his episodes of chest pain have also occurred while at rest. Due to these episodes of chest pain, the patient came into the ED for evaluation. Here in the ED, the patient denies any ongoing chest pain. He has not had any nausea, vomiting, or radiating pain with his chest pain. He has not traveled or had surgery recently, has not had any recent leg swelling, and has no history of blood clots. His cardiologist is Dr. Kuldeep Baldwin.     Cardiac Risk Factors:  CAD:    Positive   Hypertension:   Positive   Hyperlipidemia:  Positive   Diabetes:   Negative   Tobacco use:   Negative   Gender:   Male   Age:    69  Familial Hx of CAD:  Positive      Allergies:  Penicillins      Medications:    Tylenol  Aspirin 81 mg   Lipitor  Coreg  Hydrochlorothiazide   Advil PM  Avapro  Multivitamin  Nitroglycerin  Flomax     Past Medical History:    CAD   Hypertension   Hyperlipidemia  Angina pectoris     Past Surgical History:    History reviewed. No pertinent past surgical history.     Family History:    Heart disease - Mother and father   Alzheimer disease - Mother   Diabetes - Father   Breast cancer - Sister     Social  History:  Tobacco use:    Never smoker   Alcohol use:    Positive   Drug use:    Negative  Marital status:       Accompanied to ED by:  Wife      Review of Systems   Respiratory: Positive for shortness of breath.    Cardiovascular: Positive for chest pain. Negative for leg swelling.   Gastrointestinal: Negative for nausea and vomiting.   All other systems reviewed and are negative.      Physical Exam   First Vitals:  BP: (!) 160/82  Heart Rate: 58  Temp: 97.8  F (36.6  C)  Resp: (16)  Weight: 80.7 kg (178 lb)  SpO2: 98 %      Physical Exam  Vitals: reviewed by me  General: Pt seen on [facility], cooperative, and alert to conversation  Eyes: Tracking well, clear conjunctiva BL  ENT: MMM, midline trachea.   Lungs: No tachypnea, no accessory muscle use. No respiratory distress.   CV: Rate as above, regular rhythm.    Abd: Soft, non tender, no guarding, no rebound. Non distended  MSK: no peripheral edema or joint effusion.  No evidence of trauma  Skin: No rash, normal turgor and temperature  Neuro: Clear speech and no facial droop.  Psych: Not RIS, no e/o AH/VH      Emergency Department Course   ECG (16:04:11):  Indication: Screening for cardiovascular disease.   Rate 55 bpm. ID interval 156 ms. QRS duration 84 ms. QT/QTc 396/378 ms. P-R-T axes 64 2 51.   Interpretation: Sinus bradycardia, Otherwise normal ECG   Agree with computer interpretation. Yes    Interpreted at 1720 by Dr. Bain.      Imaging:  Radiographic findings were communicated with the patient and family who voiced understanding of the findings.    XR Chest:  IMPRESSION: No radiographic evidence of acute chest abnormality.   Per radiology.     Laboratory:  CBC: WNL (WBC 6.3, HGB 14.6, )  CMP: BUN 41 high, o/w WNL (Creatinine 1.11)   Troponin I 1733: <0.015    Troponin I 1940: <0.015     Interventions:  1939 Aspirin 162 mg PO     Emergency Department Course:  Nursing notes and vitals reviewed.  1735: I performed an exam of  the patient as documented above.     1903: I updated and reassessed the patient.     1939: I updated and reassessed the patient.     2002: I spoke with Dr. Granados of the hospitalist service regarding patient's presentation, findings, and plan of care.     Findings and plan explained to the Patient and spouse who consents to admission. Discussed the patient with Dr. Granados, who will admit the patient to an obs bed for further monitoring, evaluation, and treatment.     Impression & Plan      Medical Decision Making:  Gonzalo Tucker is a very pleasant 69 year old male who presents to the emergency room with chest pain that is sometimes exertional. He also has a history of coronary artery disease, and his last stress test was about 10 years ago. He has some shortness of breath as well at the [employer] today, which prompted him to come here today. I do think that he needs to be admitted for provocative testing. He has a HEART score of 5, given a moderately concerning story, his age, and history of CAD. Thankfully he has no evidence of a NSTEMI as yet, will plan for admission as above to the care of Dr. Granados.     Diagnosis:    ICD-10-CM   1. Chest pain, unspecified type R07.9     Disposition:  Admitted to Dr. Granados.       I, Calvin Comer, am serving as a scribe at 5:35 PM on 12/26/2019 to document services personally performed by Dr. Bain, based on my observations and the provider's statements to me.     EMERGENCY DEPARTMENT       Kuldeep Bain MD  12/26/19 7420

## 2019-12-26 NOTE — TELEPHONE ENCOUNTER
Received a call from pt stating he's been having sxs of chest pain on and off for the last month. Feels like it's getting worse now and more frequent.     CP can last mins- about 1 hr. Had episode last night with diaphoresis (new sxs). Increased fatigue lately. Pain 2 out of 10. Does not necessarily come on w/ exertions. CP is random.     Denies having CP right now. Denies SOB, dizziness, nausea.     Advised pt to go to ER if he has CP. Advised he take a nitroglycerin if he has CP.     Hx of NSTEMI in 2007 and s/p 3.5 x 38 mm length Sirolimus-eluting Cypher stent in the LAD.     Pt agrees w/ plan. Will route to  to review and advise. COLETTE North

## 2019-12-27 ENCOUNTER — APPOINTMENT (OUTPATIENT)
Dept: NUCLEAR MEDICINE | Facility: CLINIC | Age: 69
End: 2019-12-27
Attending: INTERNAL MEDICINE
Payer: MEDICARE

## 2019-12-27 ENCOUNTER — APPOINTMENT (OUTPATIENT)
Dept: CARDIOLOGY | Facility: CLINIC | Age: 69
End: 2019-12-27
Attending: INTERNAL MEDICINE
Payer: MEDICARE

## 2019-12-27 VITALS
OXYGEN SATURATION: 98 % | RESPIRATION RATE: 16 BRPM | WEIGHT: 176.1 LBS | TEMPERATURE: 96.1 F | BODY MASS INDEX: 24.56 KG/M2 | SYSTOLIC BLOOD PRESSURE: 142 MMHG | DIASTOLIC BLOOD PRESSURE: 76 MMHG

## 2019-12-27 LAB
CV STRESS MAX HR HE: 155
NUC STRESS EJECTION FRACTION: 74 %
RATE PRESSURE PRODUCT: NORMAL
STRESS ANGINA INDEX: 0
STRESS ECHO BASELINE DIASTOLIC HE: 85
STRESS ECHO BASELINE HR: 60
STRESS ECHO BASELINE SYSTOLIC BP: 153
STRESS ECHO CALCULATED PERCENT HR: 101 %
STRESS ECHO LAST STRESS DIASTOLIC BP: 74
STRESS ECHO LAST STRESS SYSTOLIC BP: 164
STRESS ECHO POST ESTIMATED WORKLOAD: 11.7 METS
STRESS ECHO POST EXERCISE DUR MIN: 9 MIN
STRESS ECHO POST EXERCISE DUR SEC: 30 SEC
STRESS ECHO TARGET HR: 153
TROPONIN I SERPL-MCNC: <0.015 UG/L (ref 0–0.04)

## 2019-12-27 PROCEDURE — 78452 HT MUSCLE IMAGE SPECT MULT: CPT

## 2019-12-27 PROCEDURE — 78452 HT MUSCLE IMAGE SPECT MULT: CPT | Mod: 26 | Performed by: INTERNAL MEDICINE

## 2019-12-27 PROCEDURE — 99217 ZZC OBSERVATION CARE DISCHARGE: CPT | Performed by: HOSPITALIST

## 2019-12-27 PROCEDURE — 84484 ASSAY OF TROPONIN QUANT: CPT | Performed by: INTERNAL MEDICINE

## 2019-12-27 PROCEDURE — 93016 CV STRESS TEST SUPVJ ONLY: CPT | Performed by: INTERNAL MEDICINE

## 2019-12-27 PROCEDURE — 36415 COLL VENOUS BLD VENIPUNCTURE: CPT | Performed by: INTERNAL MEDICINE

## 2019-12-27 PROCEDURE — 34300033 ZZH RX 343: Performed by: INTERNAL MEDICINE

## 2019-12-27 PROCEDURE — 93018 CV STRESS TEST I&R ONLY: CPT | Performed by: INTERNAL MEDICINE

## 2019-12-27 PROCEDURE — 93017 CV STRESS TEST TRACING ONLY: CPT

## 2019-12-27 PROCEDURE — G0378 HOSPITAL OBSERVATION PER HR: HCPCS

## 2019-12-27 PROCEDURE — A9502 TC99M TETROFOSMIN: HCPCS | Performed by: INTERNAL MEDICINE

## 2019-12-27 PROCEDURE — 25000132 ZZH RX MED GY IP 250 OP 250 PS 637: Mod: GY | Performed by: INTERNAL MEDICINE

## 2019-12-27 RX ADMIN — TETROFOSMIN 32.1 MCI.: 1.38 INJECTION, POWDER, LYOPHILIZED, FOR SOLUTION INTRAVENOUS at 09:20

## 2019-12-27 RX ADMIN — TETROFOSMIN 11 MCI.: 1.38 INJECTION, POWDER, LYOPHILIZED, FOR SOLUTION INTRAVENOUS at 07:15

## 2019-12-27 RX ADMIN — HYDROCHLOROTHIAZIDE 25 MG: 25 TABLET ORAL at 10:21

## 2019-12-27 NOTE — ED NOTES
"[facility]  ED Nurse Handoff Report    ED Chief complaint: Chest Pain (Pt complains of intermittent chest pain for the last month rated 2/10.  Denies SOB.  )      ED Diagnosis:   Final diagnoses:   Chest pain, unspecified type       Code Status: Full Code    Allergies:   Allergies   Allergen Reactions     Penicillins Rash       Activity level - Baseline/Home:  Independent  Activity Level - Current:   Independent    Patient's Preferred language: english    Needed?: No    Isolation: No  Infection: Not Applicable  Bariatric?: No    Vital Signs:   Vitals:    12/26/19 1606   BP: (!) 160/82   Temp: 97.8  F (36.6  C)   TempSrc: Oral   SpO2: 98%   Weight: 80.7 kg (178 lb)       Cardiac Rhythm: ,   Cardiac  Cardiac Rhythm: Normal sinus rhythm    Pain level: 0-10 Pain Scale: 1(CP)    Is this patient confused?: No   Does this patient have a guardian?  No         If yes, is there guardianship documents in the Epic \"Code/ACP\" activity?  N/A         Guardian Notified?  N/A  Glenn - Suicide Severity Rating Scale Completed?  Yes  If yes, what color did the patient score?  White    Patient Report: Initial Complaint: CP   Focused Assessment: pt has had intermittent CP for weeks; He currently isn't in pain but he has had a stent in the past;   Tests Performed: CXR, labs  Abnormal Results:   Labs Ordered and Resulted from Time of ED Arrival Up to the Time of Departure from the ED   COMPREHENSIVE METABOLIC PANEL - Abnormal; Notable for the following components:       Result Value    Urea Nitrogen 41 (*)     All other components within normal limits   CBC WITH PLATELETS DIFFERENTIAL   TROPONIN I   TROPONIN I   PERIPHERAL IV CATHETER       Treatments provided: ASA     Family Comments: wife at bedside    OBS brochure/video discussed/provided to patient/family: Yes              Name of person given brochure if not patient:               Relationship to patient:     ED Medications:   Medications   aspirin (ASA) " chewable tablet 162 mg (162 mg Oral Given 12/26/19 1939)       Drips infusing?:  No    For the majority of the shift this patient was Green.   Interventions performed were .    Severe Sepsis OR Septic Shock Diagnosis Present: No    To be done/followed up on inpatient unit:  Stress test tomorrow     ED NURSE PHONE NUMBER: [phone]

## 2019-12-27 NOTE — PHARMACY-ADMISSION MEDICATION HISTORY
Pharmacy Medication History  Admission medication history interview status for the 12/26/2019  admission is complete. See EPIC admission navigator for prior to admission medications     Medication history sources: Spoke w/ patient.  Reviewed recent fill history through [employer].   Medication history source reliability: Good  Adherence assessment: Moderate    Additional medication history information:   - Patient states that he is taking Cyclosporine and Cetirizine for hives.      Medication reconciliation completed by provider prior to medication history? No    Time spent in this activity: 15 minutes      Prior to Admission medications    Medication Sig Last Dose Taking? Auth Provider   aspirin 81 MG tablet Take 81 mg by mouth every evening  12/25/2019 at pm Yes Reported, Patient   atorvastatin (LIPITOR) 80 MG tablet Take 1 tablet (80 mg) by mouth daily 12/25/2019 at pm Yes Kuldeep Baldwin MD   carvedilol (COREG) 6.25 MG tablet Take 2 tablets (12.5 mg) by mouth 2 times daily (with meals) 12/26/2019 at am Yes Kuldeep Baldwin MD   cetirizine (ZYRTEC) 10 MG tablet Take 10 mg by mouth 2 times daily  12/26/2019 at am Yes Unknown, Entered By History   cycloSPORINE modified (GENERIC EQUIVALENT) 100 MG capsule Take 100 mg by mouth 2 times daily 12/26/2019 at am Yes Unknown, Entered By History   hydrochlorothiazide (HYDRODIURIL) 25 MG tablet Take 1 tablet (25 mg) by mouth daily 12/26/2019 at am Yes Kuldeep Baldwin MD   irbesartan (AVAPRO) 150 MG tablet Take 2 tablets (300 mg) by mouth At Bedtime 12/25/2019 at pm Yes Kuldeep Baldwin MD   multivitamin w/minerals (THERA-VIT-M) tablet Take 1 tablet by mouth daily 12/26/2019 at am Yes Unknown, Entered By History   nitroGLYcerin (NITROSTAT) 0.4 MG sublingual tablet Place 1 tablet (0.4 mg) under the tongue every 5 minutes as needed for chest pain  at prn Yes Kuldeep Baldwin MD Carolyn Dahlman, PharmD, BCPS

## 2019-12-27 NOTE — DISCHARGE SUMMARY
[facility]  Hospitalist Discharge Summary       Date of Admission:  12/26/2019  Date of Discharge:  12/27/2019  1:16 PM  Discharging Provider: Omari Lopez MD      Discharge Diagnoses   1. Atypical chest discomfort - resolved  2. History of CAD - NSTEMI w/ LAD stenting  3. HTN  4. HL    Follow-ups Needed After Discharge   Patient declined follow up appt - will call PCP or cardiologist if questions or symptoms arise.     Unresulted Labs Ordered in the Past 30 Days of this Admission     No orders found for last 31 day(s).      These results will be followed up by NA    Discharge Disposition   Discharged to home  Condition at discharge: Stable    Hospital Course   Gonzalo Tucker is a 69 year old male with a history of NSTEMI s/p stenting to the LAD in 2006, HTN and HLP who presented to the ED on 12/26/2019 with intermittent chest pain and was admitted for ACS rule out     Atypical chest discomfort  H/o NSTEMI s/p stenting to LAD in 2006  HTN/HLP   For the past month has had intermittent left sided chest pressure.  Occasionally brought on by exertion but does not usually see improvement with rest.  No other associated symptoms.  Initial troponin negative.  EKG without evidence of acute ischemia  - telemetry unrevealing, troponin negative x 4  - PTA ASA 81 mg, Coreg 6.25 mg BID< hydrochlorothiazide 25 mg, Irbesartan 150 mg   - PTA lipitor resumed upon discharge  - PTA SL Nitro continued  - Lexiscan WNL - above average exercise capacity noted, EF appropriate  - patient declined follow up appt - will arrange on his own if he feels necessary  - discussed consideration of PPI trial although patient cannot correlate symptoms with food intake; he will consider this and discuss with PCP     Consultations This Hospital Stay   None    Code Status   Prior    Time Spent on this Encounter   I, Omari Lopez MD, personally saw the patient today and spent greater than 30 minutes discharging this patient.        Omari Lopez MD  [facility]  ______________________________________________________________________    Physical Exam   Vital Signs: Temp: 96.1  F (35.6  C) Temp src: Oral BP: (!) 142/76   Heart Rate: 59 Resp: 16 SpO2: 98 % O2 Device: None (Room air)    Weight: 176 lbs 1.6 oz    Gen: NAD, pleasant  HEENT: Normocephalic, EOMI, MMM  Resp: no crackles,  no wheezes, no increased work of resp  CV: S1S2 heard, reg rhythm, borderline kj rate, no pedal edema  Abdo: soft, nontender, nondistended, bowel sounds present  Ext: calves nontender, well perfused  Neuro: AAOx3, CN grossly intact, no facial asymmetry         Primary Care Physician   Regan Archuleta    Discharge Orders      Reason for your hospital stay    Atypical chest discomfort     Follow-up and recommended labs and tests     PCP and/or Cardiologist as needed - patient wishes to hold off from scheduling follow up for now     Activity    Your activity upon discharge: activity as tolerated     Discharge Instructions    Continue taking prior to admission medications as directed.  Follow up with your PCP or cardiologist if needed. Return to care if symptoms return.     Diet    Follow this diet upon discharge: Orders Placed This Encounter     Heart healthy diet       Significant Results and Procedures   Most Recent 3 CBC's:  Recent Labs   Lab Test 12/26/19 1733 08/30/16  0006   WBC 6.3 9.7   HGB 14.6 15.4   MCV 87 86    198     Most Recent 3 BMP's:  Recent Labs   Lab Test 12/26/19 1733 08/30/16  0006 11/11/15  0734    141 140   POTASSIUM 4.2 4.1 4.4   CHLORIDE 106 106 106   CO2 31 28 28   BUN 41* 22 21   CR 1.11 0.91 0.91   ANIONGAP 3 7 6   ESPERANZA 9.3 8.8 9.0   GLC 89 159* 110*     Most Recent 3 Troponin's:  Recent Labs   Lab Test 12/27/19  0250 12/26/19  2155 12/26/19  1940   TROPI <0.015 <0.015 <0.015     Most Recent 3 BNP's:No lab results found.,   Results for orders placed or performed during the hospital encounter of 12/26/19    XR Chest 2 Views    Narrative    CHEST TWO VIEWS 12/26/2019 6:38 PM     HISTORY: Chest pain    COMPARISON: 9/26/2010    FINDINGS: Heart size and pulmonary vascularity are within normal  limits. The lungs are clear. No pneumothorax or pleural effusion.       Impression    IMPRESSION: No radiographic evidence of acute chest abnormality.     NEDA MIRAMONTES MD   NM Exercise stress test (nuc card)     Value    Target     Baseline Systolic     Baseline Diastolic BP 85    Last Stress Systolic     Last Stress Diastolic BP 74    Baseline HR 60    Max     Calculated Percent     Exercise duration (min) 9    Exercise duration (sec) 30    Estimated workload 11.7    Rate Pressure Product 25,420.0    Angina Index 0    Left Ventricular EF 74    Narrative       The nuclear stress test is probably negative for inducible myocardial   ischemia or infarction.     Left ventricular function is hyperdynamic.     The left ventricular ejection fraction at stress is 74%.     The patient's exercise capacity is above average.     There is no prior study for comparison.           Discharge Medications   Discharge Medication List as of 12/27/2019 12:01 PM      CONTINUE these medications which have NOT CHANGED    Details   aspirin 81 MG tablet Take 81 mg by mouth every evening , Historical      atorvastatin (LIPITOR) 80 MG tablet Take 1 tablet (80 mg) by mouth daily, Disp-90 tablet, R-2, E-Prescribe      carvedilol (COREG) 6.25 MG tablet Take 2 tablets (12.5 mg) by mouth 2 times daily (with meals), Disp-360 tablet, R-3, E-Prescribe      cetirizine (ZYRTEC) 10 MG tablet Take 10 mg by mouth 2 times daily , Historical      cycloSPORINE modified (GENERIC EQUIVALENT) 100 MG capsule Take 100 mg by mouth 2 times daily, Historical      hydrochlorothiazide (HYDRODIURIL) 25 MG tablet Take 1 tablet (25 mg) by mouth daily, Disp-90 tablet, R-2, E-Prescribe      irbesartan (AVAPRO) 150 MG tablet Take 2 tablets (300 mg) by mouth  At Bedtime, Disp-180 tablet, R-3, E-Prescribe      multivitamin w/minerals (THERA-VIT-M) tablet Take 1 tablet by mouth daily, Historical      nitroGLYcerin (NITROSTAT) 0.4 MG sublingual tablet Place 1 tablet (0.4 mg) under the tongue every 5 minutes as needed for chest pain, Disp-25 tablet, R-12, E-Prescribe           Allergies   Allergies   Allergen Reactions     Penicillins Rash

## 2019-12-27 NOTE — PLAN OF CARE
Arrived to unit 2100 from ED. A&Ox4. VSS on RA. Denies chest pain, SOB, numbness&tingling. Tele: SR. Varela negx3, last draw @ 0200. Up independently. Tylenol for sore neck. NPO at 0000. Plan for NM stress test in AM. Continue to monitor.

## 2019-12-27 NOTE — H&P
[facility]    History and Physical - Hospitalist Service       Date of Admission:  12/26/2019    Assessment & Plan   Gonzalo Tucker is a 69 year old male with a history of NSTEMI s/p stenting to the LAD in 2006, HTN and HLP who presented to the ED on 12/26/2019 with intermittent chest pain and was admitted for ACS rule out    ACS rule out  H/o NSTEMI s/p stenting to LAD in 2006  HTN/HLP   For the past month has had intermittent left sided chest pressure.  Occasionally brought on by exertion but does not usually see improvement with rest.  No other associated symptoms.  Initial troponin negative.  EKG without evidence of acute ischemia  - Admission to observation on telemetry  - PTA ASA 81 mg, Coreg 6.25 mg BID< hydrochlorothiazide 25 mg, Irbesartan 150 mg   - Holding PTA Lipitor as observation status  - PRN Nitro   - Serial troponins  - Exercise with MPI ordered        Diet: Cardiac diet   DVT Prophylaxis: Low Risk/Ambulatory with no VTE prophylaxis indicated  Chery Catheter: not present  Code Status: Full code     Disposition Plan   Expected discharge: Tomorrow, recommended to prior living arrangement once cardiac evaluation complete.  Entered: Monty Granados DO 12/26/2019, 8:26 PM     The patient's care was discussed with the Patient, Patient's Family and ED physician.    Monty Granados DO  [facility]    ______________________________________________________________________    Chief Complaint   Chest pain     History is obtained from the patient    History of Present Illness   Gonzalo Tucker is a 69 year old male who presented to the ED with chest pressure.  Patient reports that for the past month he has had intermittent episodes of chest pressure.  He describes it as left sided and rates it as a 1-2/10 when it occurs.  He does note that it does not always occur with exertion but does not believe it is changed with rest.  He called his cardiology clinic today and they  instructed him to come in to the ED if he had another episode.  This afternoon he was walking around [place] after lunch when he had another episode prompting him to come in.  Currently he is chest pain free.  He does note last night he had one episode while in bed with associated diaphoresis but no other associated symptoms including SOB, palpitations, light headedness or nausea.  He does have a history of MI with stenting in  but felt the chest pain at that time was different.  No recent fevers, chills, cough, sore throat, abdominal pain, vomiting, diarrhea, bloody stools, difficulties with urination, leg pain or leg swelling.      Review of Systems    The 10 point Review of Systems is negative other than noted in the HPI    Past Medical History    I have reviewed this patient's medical history and updated it with pertinent information if needed.   Past Medical History:   Diagnosis Date     Angina pectoris (H)      CAD (coronary artery disease)     nonSTEMI, 2007 cath - JASON LAD     HTN (hypertension)      Hyperlipidemia      MI, old     nonSTEMI       Past Surgical History   I have reviewed this patient's surgical history and updated it with pertinent information if needed.  History reviewed. No pertinent surgical history.    Social History   I have reviewed this patient's social history and updated it with pertinent information if needed.  Social History     Tobacco Use     Smoking status: Never Smoker     Smokeless tobacco: Never Used   Substance Use Topics     Alcohol use: Yes     Alcohol/week: 0.0 standard drinks     Comment: 2+ beers a week     Drug use: No       Family History   I have reviewed this patient's family history and updated it with pertinent information if needed.   Family History   Problem Relation Age of Onset     Heart Disease Mother          from heart attack     Alzheimer Disease Mother      Diabetes Father      Heart Disease Father      Heart Disease Paternal Grandmother       Heart Disease Paternal Grandfather      Breast Cancer Sister 50     Other - See Comments Brother         AVM - atririal venous malfunction     Prior to Admission Medications   Prior to Admission Medications   Prescriptions Last Dose Informant Patient Reported? Taking?   aspirin 81 MG tablet 12/25/2019 at pm Self Yes Yes   Sig: Take 81 mg by mouth every evening    atorvastatin (LIPITOR) 80 MG tablet 12/25/2019 at pm Self No Yes   Sig: Take 1 tablet (80 mg) by mouth daily   carvedilol (COREG) 6.25 MG tablet 12/26/2019 at am Self No Yes   Sig: Take 2 tablets (12.5 mg) by mouth 2 times daily (with meals)   cetirizine (ZYRTEC) 10 MG tablet 12/26/2019 at am Self Yes Yes   Sig: Take 10 mg by mouth 2 times daily    cycloSPORINE modified (GENERIC EQUIVALENT) 100 MG capsule 12/26/2019 at am Self Yes Yes   Sig: Take 100 mg by mouth 2 times daily   hydrochlorothiazide (HYDRODIURIL) 25 MG tablet 12/26/2019 at am Self No Yes   Sig: Take 1 tablet (25 mg) by mouth daily   irbesartan (AVAPRO) 150 MG tablet 12/25/2019 at pm Self No Yes   Sig: Take 2 tablets (300 mg) by mouth At Bedtime   multivitamin w/minerals (THERA-VIT-M) tablet 12/26/2019 at am Self Yes Yes   Sig: Take 1 tablet by mouth daily   nitroGLYcerin (NITROSTAT) 0.4 MG sublingual tablet  at prn Self No Yes   Sig: Place 1 tablet (0.4 mg) under the tongue every 5 minutes as needed for chest pain      Facility-Administered Medications: None     Allergies   Allergies   Allergen Reactions     Penicillins Rash       Physical Exam   Vital Signs: Temp: 97.8  F (36.6  C) Temp src: Oral BP: (!) 160/82   Heart Rate: 58   SpO2: 98 %      Weight: 178 lbs 0 oz    General Appearance: Resting comfortably.  NAD  Eyes: EOMI.  Normal conjunctiva  HEENT: NC/AT.  Moist mucous membranes  Respiratory: Clear to auscultation.  No respiratory distress  Cardiovascular: RRR.  No obvious murmurs  GI: Bowel sounds present.  Non-tender  Skin: No rashes.  No cyanosis  Musculoskeletal: No edema.  No calf  tenderness  Neurologic: No focal deficits.  CN appear intact  Psychiatric: Alert.  Pleasant     Data   Data reviewed today: I reviewed all medications, new labs and imaging results over the last 24 hours. I personally reviewed   CXR:  No infiltrates.  No pleural effusions.  No cardiomegaly  EKG:  Sinus bradycardia.  Rate 55 BPM.  No concerning ST or T wave changes to suggest acute ischemia    Recent Labs   Lab 12/26/19  1940 12/26/19  1733   WBC  --  6.3   HGB  --  14.6   MCV  --  87   PLT  --  182   NA  --  140   POTASSIUM  --  4.2   CHLORIDE  --  106   CO2  --  31   BUN  --  41*   CR  --  1.11   ANIONGAP  --  3   ESPERANZA  --  9.3   GLC  --  89   ALBUMIN  --  4.1   PROTTOTAL  --  7.9   BILITOTAL  --  0.9   ALKPHOS  --  117   ALT  --  50   AST  --  36   TROPI <0.015 <0.015     Recent Results (from the past 24 hour(s))   XR Chest 2 Views    Narrative    CHEST TWO VIEWS 12/26/2019 6:38 PM     HISTORY: Chest pain    COMPARISON: 9/26/2010    FINDINGS: Heart size and pulmonary vascularity are within normal  limits. The lungs are clear. No pneumothorax or pleural effusion.       Impression    IMPRESSION: No radiographic evidence of acute chest abnormality.     NEDA MIRAMONTES MD

## 2019-12-27 NOTE — PROGRESS NOTES
RECEIVING UNIT ED HANDOFF REVIEW    ED Nurse Handoff Report was reviewed by: Ernestina Mullins RN on December 26, 2019 at 8:50 PM

## 2019-12-27 NOTE — PLAN OF CARE
Observation goals PRIOR TO DISCHARGE     Comments: List all goals to be met before discharge home:     - Serial troponins and stress test complete-Not met, stress test in AM    - Seen and cleared by consultant if applicable -N/A    - Adequate pain control on oral analgesia-Met, denies any pain     - Vital signs normal or at patient baseline -Not met, kj at times    - Safe disposition plan has been identified -Met    - Nurse to notify provider when observation goals have been met and patient is ready for discharge.

## 2019-12-27 NOTE — PLAN OF CARE
Observation goals PRIOR TO DISCHARGE     Comments: List all goals to be met before discharge home:     - Serial troponins and stress test complete-Not met, stress test in AM    - Seen and cleared by consultant if applicable -N/A    - Adequate pain control on oral analgesia-Met, denies any pain     - Vital signs normal or at patient baseline -Not met, kj at times    - Safe disposition plan has been identified -Met    - Nurse to notify provider when observation goals have been met and patient is ready for discharge.        Pt is A&OX4, kj otherwise VSS on RA.Up ind, amb to bathroom and voiding o.k, denies any chest pain nor any pain anywhere.Trops neg x3, Tele-SB. PIV is s/l on rt AC. NPO for NM stress test.

## 2019-12-27 NOTE — PLAN OF CARE
Observation goals PRIOR TO DISCHARGE     Comments: List all goals to be met before discharge home:     - Serial troponins and stress test complete-met    - Seen and cleared by consultant if applicable -N/A    - Adequate pain control on oral analgesia-Met, denies any pain     - Vital signs normal or at patient baseline -met, kj at times    - Safe disposition plan has been identified -Met    - Nurse to notify provider when observation goals have been met and patient is ready for discharge. Pt. Discharging.          Pt is A&OX4, kj otherwise VSS on RA.Up ind, amb to bathroom and voiding o.k, denies any chest pain nor any pain anywhere.Trops neg x3, Tele-SB.

## 2020-04-21 ENCOUNTER — VIRTUAL VISIT (OUTPATIENT)
Dept: CARDIOLOGY | Facility: CLINIC | Age: 70
End: 2020-04-21
Attending: INTERNAL MEDICINE
Payer: MEDICARE

## 2020-04-21 DIAGNOSIS — I10 BENIGN ESSENTIAL HYPERTENSION: ICD-10-CM

## 2020-04-21 DIAGNOSIS — I25.10 CORONARY ARTERY DISEASE INVOLVING NATIVE CORONARY ARTERY OF NATIVE HEART WITHOUT ANGINA PECTORIS: ICD-10-CM

## 2020-04-21 PROCEDURE — 99214 OFFICE O/P EST MOD 30 MIN: CPT | Mod: 95 | Performed by: INTERNAL MEDICINE

## 2020-04-21 NOTE — LETTER
4/21/2020    Regan Decker MD   [facility]   [address] [place]  [address]       RE: Gonzalo Tucker  [id number] [address] [id number]       Dear Colleague,    Thank you for the opportunity to participate in the care of your patient, Gonzalo Tucker, at the [facility]. Please see a copy of my visit note below.    Gonzalo Tucker is a 69 year old male who is being evaluated via a billable video visit.       CARDIOLOGY CLINIC TELEMETRY VISIT      The telemetry visit was performed using the [employer] lonnie.  The time that the visit video  began was 10:20.  The time the video visit  ended is 10:38 a.m.  The location of the patient is at home.  The location of the provider is [place], [place].      Gonzalo Tucker, a 69-year-old man with coronary artery disease, dyslipidemia, hypertension and idiopathic urticaria was seen today with a telemetry visit for followup at the request of his referring doctor, Dr. Regan Decker.      Since I last saw the patient in 05/2019, Mr. Tucker was admitted to [facility] for an episode of atypical chest pain.  Myocardial infarction was ruled out.  A nuclear stress test showed no evidence of ischemia.      During our last visit, the patient had complained of a rash.  We were initially concerned this rash might be secondary to one of his cardiac medications.  However, after consultation with the dermatologist, it was determined that his rash was idiopathic.  The patient was placed on cyclophosphamide but discontinued this medication about 3 weeks ago because of concerns about possible long-term toxicity.  Thus far, his symptoms have remained controlled on Zyrtec.      PAST MEDICAL HISTORY:   1.  Hypertension.   2.  Dyslipidemia.   3.  Coronary artery disease.   a.  Non-ST segment elevation MI 2007.    b.  Stent implantation in proximal LAD.  Right coronary, circumflex and left main with no significant narrowing.   c.  Negative nuclear stress test 2011 and 2019.   4.  Urticaria -- idiopathic in nature.  Off cyclophosphamide.      PHYSICAL EXAMINATION:  Exam today by visit shows the patient appears to be comfortable, well rested.  There is no diaphoresis or dyspnea.  His blood pressure is 140/75 with his home cuff, but it has been well controlled on previous blood pressures.      LABORATORY STUDIES:  Most recent lipid profile with his primary care physician on 05/29/2019 showed an LDL cholesterol of 71 while on statin therapy.      ASSESSMENT:  Mr. Tucker is asymptomatic on guideline-directed medical therapy.  Presently he is free of cardiovascular complaints.  All of his past office  blood pressures have been well controlled, and I have asked him to recheck his blood pressure with his primary care doctor in the near future.  In the meantime, he will remain on a Mediterranean-style diet and a regular exercise program.      RECOMMENDATIONS:   1.  Continue present medical therapy.   2.  Mediterranean style weight loss diet.   3.  Regular exercise program.   4.  Followup visit in about 1 year.      We appreciate the opportunity to participate in the care of your patient, Mr. Gonzalo Tucker.       Please do not hesitate to contact me if you have any questions/concerns.     Sincerely,     Kuldeep Baldwin MD

## 2020-04-21 NOTE — PROGRESS NOTES
"Gonzalo Tucker is a 69 year old male who is being evaluated via a billable video visit.      The patient has been notified of following:     \"This video visit will be conducted via a call between you and your physician/provider. We have found that certain health care needs can be provided without the need for an in-person physical exam.  This service lets us provide the care you need with a video conversation.  If a prescription is necessary we can send it directly to your pharmacy.  If lab work is needed we can place an order for that and you can then stop by our lab to have the test done at a later time.    Video visits are billed at different rates depending on your insurance coverage.  Please reach out to your insurance provider with any questions.    If during the course of the call the physician/provider feels a video visit is not appropriate, you will not be charged for this service.\"    Patient has given verbal consent for Video visit? Yes    How would you like to obtain your AVS? Mail a copy    Patient would like the video invitation sent by: Text to cell phone: [phone]       Review Of Systems  Skin: negative, rash  Eyee, glasses  Ears/Nose/Throat: negative  Respiratory: No shortness of breath, dyspnea on exertion, cough, or hemoptysis  Cardiovascular: negative  Gastrointestinal: negative  Genitourinary: negative  Musculoskeletal: negative  Neurologic: negative  Psychiatric: negative  Hematologic/Lymphatic/Immunologic: negative  Endocrine: negative    Patient reported vitals:  BP:  143/77  Heart rate: 65  Weight: 180 lbs      Rhonda Catalan CMA    Video Start Time 10:20      Additional provider notes see  dictation    Video-Visit Details    Type of service:  Video Visit    Video End Time (time video stopped):10:38    Originating Location (pt. Location):  Home  Distant Location (provider location):  [facility]     Mode of Communication:  Video Conference via " [employer]      Done

## 2020-04-21 NOTE — PROGRESS NOTES
HISTORY OF PRESENT ILLNESS:  See dictation    Orders this Visit:  No orders of the defined types were placed in this encounter.    No orders of the defined types were placed in this encounter.    There are no discontinued medications.    Encounter Diagnoses   Name Primary?     Coronary artery disease involving native coronary artery of native heart without angina pectoris      Benign essential hypertension        CURRENT MEDICATIONS:  Current Outpatient Medications   Medication Sig Dispense Refill     aspirin 81 MG tablet Take 81 mg by mouth every evening        atorvastatin (LIPITOR) 80 MG tablet Take 1 tablet (80 mg) by mouth daily 90 tablet 2     carvedilol (COREG) 6.25 MG tablet Take 2 tablets (12.5 mg) by mouth 2 times daily (with meals) 360 tablet 3     cetirizine (ZYRTEC) 10 MG tablet Take 10 mg by mouth 2 times daily        hydrochlorothiazide (HYDRODIURIL) 25 MG tablet Take 1 tablet (25 mg) by mouth daily 90 tablet 2     irbesartan (AVAPRO) 150 MG tablet Take 2 tablets (300 mg) by mouth At Bedtime 180 tablet 3     multivitamin w/minerals (THERA-VIT-M) tablet Take 1 tablet by mouth daily       cycloSPORINE modified (GENERIC EQUIVALENT) 100 MG capsule Take 100 mg by mouth 2 times daily       nitroGLYcerin (NITROSTAT) 0.4 MG sublingual tablet Place 1 tablet (0.4 mg) under the tongue every 5 minutes as needed for chest pain (Patient not taking: Reported on 4/21/2020) 25 tablet 12       ALLERGIES     Allergies   Allergen Reactions     Penicillins Rash       PAST MEDICAL, SURGICAL, FAMILY, SOCIAL HISTORY:  History was reviewed and updated as needed, see medical record.    Review of Systems:  A 12-point review of systems was completed, see medical record for detailed review of systems information.    Physical Exam:  Vitals: There were no vitals taken for this visit.    Constitutional:           Skin:           Head:           Eyes:           ENT:           Neck:           Chest:           Cardiac:                     Abdomen:           Vascular:                                        Extremities and Back:           Neurological:           ASSESSMENT: stable CAD        RECOMMENDATIONS:   Mediterranean diet  Exercise program   Continue present medications      Recent Lab Results:  LIPID RESULTS:  Lab Results   Component Value Date    CHOL 153 12/26/2017    HDL 37 (L) 12/26/2017    LDL 84 12/26/2017    TRIG 161 (H) 12/26/2017    CHOLHDLRATIO 3.1 11/11/2015       LIVER ENZYME RESULTS:  Lab Results   Component Value Date    AST 36 12/26/2019    ALT 50 12/26/2019       CBC RESULTS:  Lab Results   Component Value Date    WBC 6.3 12/26/2019    RBC 5.02 12/26/2019    HGB 14.6 12/26/2019    HCT 43.6 12/26/2019    MCV 87 12/26/2019    MCH 29.1 12/26/2019    MCHC 33.5 12/26/2019    RDW 14.1 12/26/2019     12/26/2019       BMP RESULTS:  Lab Results   Component Value Date     12/26/2019    POTASSIUM 4.2 12/26/2019    CHLORIDE 106 12/26/2019    CO2 31 12/26/2019    ANIONGAP 3 12/26/2019    GLC 89 12/26/2019    BUN 41 (H) 12/26/2019    CR 1.11 12/26/2019    GFRESTIMATED 68 12/26/2019    GFRESTBLACK 79 12/26/2019    ESPERANZA 9.3 12/26/2019        A1C RESULTS:  No results found for: A1C    INR RESULTS:  Lab Results   Component Value Date    INR 1.04 02/10/2008    INR 0.97 08/25/2007       We greatly appreciate the opportunity to be involved in the care of your patient, Gonzalo Tucker.    Sincerely,  Kuldeep Baldwin MD        Kuldeep Baldwin MD  [address]  [place] [id number]

## 2020-04-21 NOTE — PROGRESS NOTES
Service Date: 04/21/2020      CARDIOLOGY CLINIC TELEMETRY VISIT      The telemetry visit was performed using the [employer].  The time that the visit video  began was 10:20.  The time the video visit  ended is 10:38 a.m.  The location of the patient is at home.  The location of the provider is [place], [place].      Gonzalo Tucker, a 69-year-old man with coronary artery disease, dyslipidemia, hypertension and idiopathic urticaria was seen today with a telemetry visit for followup at the request of his referring doctor, Dr. Regan Decker.      Since I last saw the patient in 05/2019, Mr. Tucker was admitted to [facility] for an episode of atypical chest pain.  Myocardial infarction was ruled out.  A nuclear stress test showed no evidence of ischemia.      During our last visit, the patient had complained of a rash.  We were initially concerned this rash might be secondary to one of his cardiac medications.  However, after consultation with the dermatologist, it was determined that his rash was idiopathic.  The patient was placed on cyclophosphamide but discontinued this medication about 3 weeks ago because of concerns about possible long-term toxicity.  Thus far, his symptoms have remained controlled on Zyrtec.      PAST MEDICAL HISTORY:   1.  Hypertension.   2.  Dyslipidemia.   3.  Coronary artery disease.   a.  Non-ST segment elevation MI 2007.   b.  Stent implantation in proximal LAD.  Right coronary, circumflex and left main with no significant narrowing.   c.  Negative nuclear stress test 2011 and 2019.   4.  Urticaria -- idiopathic in nature.  Off cyclophosphamide.      PHYSICAL EXAMINATION:  Exam today by visit shows the patient appears to be comfortable, well rested.  There is no diaphoresis or dyspnea.  His blood pressure is 140/75 with his home cuff, but it has been well controlled on previous blood pressures.      LABORATORY STUDIES:  Most  recent lipid profile with his primary care physician on 2019 showed an LDL cholesterol of 71 while on statin therapy.      ASSESSMENT:  Mr. Tucker is asymptomatic on guideline-directed medical therapy.  Presently he is free of cardiovascular complaints.  All of his past office  blood pressures have been well controlled, and I have asked him to recheck his blood pressure with his primary care doctor in the near future.  In the meantime, he will remain on a Mediterranean-style diet and a regular exercise program.      RECOMMENDATIONS:   1.  Continue present medical therapy.   2.  Mediterranean style weight loss diet.   3.  Regular exercise program.   4.  Followup visit in about 1 year.      We appreciate the opportunity to participate in the care of your patient, Mr. Gonzalo Tucker.      cc:   Regan Decker MD   [facility]   [id number] [place] [address]  [id number]         PRAVEEN ANGLIN MD             D: 2020   T: 2020   MT: semaj      Name:     GONZALO TUCKER   MRN:      [id number]        Account:      [id number]   :      1950           Service Date: 2020      Document: [id number]

## 2020-04-22 NOTE — PROGRESS NOTES
Service Date: 04/21/2020      PRIMARY CARE PHYSICIAN:  Regan Decker MD      The telemetry visit was conducted using the documistic.  The video start time was 10:20 a.m. and the video end time was 10:38 a.m. done from the [facility] in [place] and the patient was at home.  The total length of the visit was 18 minutes.      HISTORY:  Gonzalo Tucker, a 69-year-old man with coronary artery disease, dyslipidemia, essential hypertension, and urticaria was seen by a video telemetry visit today for followup.      Since last seen on 05/06/2019, Mr. Tucker was seen at the [facility] for an episode of atypical chest pain.  He ruled out for myocardial infarction and underwent a nuclear stress test that showed no ischemia and good exercise tolerance.  He was then discharged and has remained free of chest, arm, neck, jaw or back discomfort since then.      Mr. Tucker developed a rash at about the  the time of our last visit and at that time we were uncertain whether that rash was a consequence of some of his medications.  The rash responded to diphenhydramine and steroids.  The patient then saw a dermatologist, who diagnosed idiopathic  urticaria.  Eventually Mr. Tucker was placed on a course of cyclophosphamide.  Mr. Tucker became concerned about the potential toxic effects of cyclophosphamide and stopped the drug about 3 weeks ago after discussing the situation with his dermatologist.  He is currently taking occasional Zyrtec tablets, but has been free of any further rash and feels well.      The patient has not been able to get back into a regular exercise program yet, but plans to resume the exercise program during the summer.      PAST MEDICAL HISTORY:   1. Coronary artery disease.   a. Presentation with non-ST segment elevation MI 2007.   b. History of implantation of 3.5 x 38 mm length Sirolimus-eluting Cypher stent in the LAD 2007.  No significant narrowing in dominant  right coronary, left main or circumflex.   c. Negative nuclear stress test  and 2019.   2.  Hypertension.   3.  Dyslipidemia.   4.  Prostatism - on tamsulosin.   5.  History of idiopathic urticaria - off of cyclophosphamide.  Controlled with Zyrtec.      PHYSICAL EXAMINATION:   GENERAL:  His examination today shows a very pleasant and cooperative 69-year-old man who appears comfortable at rest.   VITAL SIGNS:  His blood pressure at home was 140/77 with a heart rate of 65.  I note his weight is up about 3-4 pounds since last seen.      LABORATORY STUDIES:  A recent lipid profile from our office is not available.  A lipid profile on 2019 with his primary care providers showed an LDL cholesterol of 71, HDL cholesterol of 28 and a total cholesterol of 162.      ASSESSMENT:  Mr. Courtney is asymptomatic on guideline-directed medical therapy with most recent LDL cholesterol being optimal.  Upon review of his primary care records and his previous office visits, his blood pressure has been well-controlled in the past.  I discussed the importance of a Mediterranean-style diet and a regular exercise program as being part of his blood pressure management long-term.  He will have his blood pressure rechecked with his primary care doctors when he has the opportunity to be.      RECOMMENDATIONS:   1. Continue present medical therapy.   2. Mediterranean style weight loss diet.   3. Regular exercise program.   4. Followup visit in about 1 year.      We greatly appreciate the opportunity to participate in the care of Mr. Gonzalo Courtney.      Kuldeep Baldwin MD      cc:   [place] [address]            KULDEEP BALDWIN MD             D: 2020   T: 2020   MT: al      Name:     GONZALO COURTNEY   MRN:      [id number]        Account:      [id number]   :      1950           Service Date: 2020      Document: [id number]

## 2020-05-26 DIAGNOSIS — E78.2 MIXED HYPERLIPIDEMIA: ICD-10-CM

## 2020-05-26 DIAGNOSIS — I25.10 CORONARY ARTERY DISEASE INVOLVING NATIVE CORONARY ARTERY OF NATIVE HEART WITHOUT ANGINA PECTORIS: ICD-10-CM

## 2020-05-26 DIAGNOSIS — I10 BENIGN ESSENTIAL HYPERTENSION: ICD-10-CM

## 2020-05-26 RX ORDER — IRBESARTAN 150 MG/1
300 TABLET ORAL AT BEDTIME
Qty: 180 TABLET | Refills: 3 | Status: SHIPPED | OUTPATIENT
Start: 2020-05-26 | End: 2021-05-21

## 2020-05-26 RX ORDER — ATORVASTATIN CALCIUM 80 MG/1
80 TABLET, FILM COATED ORAL DAILY
Qty: 90 TABLET | Refills: 0 | Status: SHIPPED | OUTPATIENT
Start: 2020-05-26 | End: 2020-08-06

## 2020-05-26 RX ORDER — CARVEDILOL 12.5 MG/1
12.5 TABLET ORAL 2 TIMES DAILY WITH MEALS
Qty: 90 TABLET | Refills: 3 | Status: SHIPPED | OUTPATIENT
Start: 2020-05-26 | End: 2020-09-24

## 2020-05-26 NOTE — TELEPHONE ENCOUNTER
Received refill request for:  Irbesartan, Carvedilol, Atorvastatin  Last OV was: 4/21/20 w/   Labs/EKG: last CMP 12/26/19   F/U scheduled: Orders for annual visit 4/2021  New script sent to: [facility]    Pt due for lipids. Sent 90 day supply. Pt will get labs drawn at [facility] as he is due for visit w/ them. [place]

## 2020-07-22 DIAGNOSIS — I10 BENIGN ESSENTIAL HYPERTENSION: ICD-10-CM

## 2020-07-22 RX ORDER — HYDROCHLOROTHIAZIDE 25 MG/1
25 TABLET ORAL DAILY
Qty: 90 TABLET | Refills: 2 | Status: SHIPPED | OUTPATIENT
Start: 2020-07-22 | End: 2021-05-21

## 2020-07-22 NOTE — TELEPHONE ENCOUNTER
Medication Refilled: hydrochlorothiazide   Last office visit: 2020  Last Labs/EK2019 CMP  Next office visit: 2021 orders in [facility]  Pharmacy sent to: gabriella Dodge RN

## 2020-08-06 DIAGNOSIS — E78.2 MIXED HYPERLIPIDEMIA: ICD-10-CM

## 2020-08-06 DIAGNOSIS — I25.10 CORONARY ARTERY DISEASE INVOLVING NATIVE CORONARY ARTERY OF NATIVE HEART WITHOUT ANGINA PECTORIS: ICD-10-CM

## 2020-08-06 RX ORDER — ATORVASTATIN CALCIUM 80 MG/1
80 TABLET, FILM COATED ORAL DAILY
Qty: 90 TABLET | Refills: 3 | Status: SHIPPED | OUTPATIENT
Start: 2020-08-06 | End: 2021-06-03

## 2020-08-06 NOTE — TELEPHONE ENCOUNTER
Medication Refilled: Atorvastatin   Last office visit: 2020 with Dr. Baldwin  Last Labs/EK2020 - FLP not ordered with annual labs - will place orders for draw at annual follow-up in   Next office visit: 2021  Pharmacy sent to: gabriella Dodge RN

## 2020-09-24 DIAGNOSIS — I10 BENIGN ESSENTIAL HYPERTENSION: ICD-10-CM

## 2020-09-24 DIAGNOSIS — E78.2 MIXED HYPERLIPIDEMIA: ICD-10-CM

## 2020-09-24 DIAGNOSIS — I25.10 CORONARY ARTERY DISEASE INVOLVING NATIVE CORONARY ARTERY OF NATIVE HEART WITHOUT ANGINA PECTORIS: ICD-10-CM

## 2020-09-24 RX ORDER — CARVEDILOL 12.5 MG/1
12.5 TABLET ORAL 2 TIMES DAILY WITH MEALS
Qty: 180 TABLET | Refills: 2 | Status: SHIPPED | OUTPATIENT
Start: 2020-09-24 | End: 2021-05-21

## 2020-09-24 NOTE — TELEPHONE ENCOUNTER
Received refill request for:  Carvedilol  Last OV was: 4/21/2020 with Dr. Baldwin  Labs/EKG: n/a  F/U scheduled: orders in Epic for 4/2021  New script sent to: [facility]

## 2021-05-12 DIAGNOSIS — Z11.59 ENCOUNTER FOR SCREENING FOR OTHER VIRAL DISEASES: ICD-10-CM

## 2021-05-21 DIAGNOSIS — I25.10 CORONARY ARTERY DISEASE INVOLVING NATIVE CORONARY ARTERY OF NATIVE HEART WITHOUT ANGINA PECTORIS: ICD-10-CM

## 2021-05-21 DIAGNOSIS — E78.2 MIXED HYPERLIPIDEMIA: ICD-10-CM

## 2021-05-21 DIAGNOSIS — I10 BENIGN ESSENTIAL HYPERTENSION: ICD-10-CM

## 2021-05-21 RX ORDER — HYDROCHLOROTHIAZIDE 25 MG/1
25 TABLET ORAL DAILY
Qty: 30 TABLET | Refills: 0 | Status: SHIPPED | OUTPATIENT
Start: 2021-05-21 | End: 2021-06-03

## 2021-05-21 RX ORDER — IRBESARTAN 150 MG/1
300 TABLET ORAL AT BEDTIME
Qty: 60 TABLET | Refills: 0 | Status: SHIPPED | OUTPATIENT
Start: 2021-05-21 | End: 2021-06-03

## 2021-05-21 RX ORDER — CARVEDILOL 12.5 MG/1
12.5 TABLET ORAL 2 TIMES DAILY WITH MEALS
Qty: 60 TABLET | Refills: 0 | Status: SHIPPED | OUTPATIENT
Start: 2021-05-21 | End: 2021-06-03

## 2021-05-21 NOTE — TELEPHONE ENCOUNTER
Received refill request for:  Carvedilol, Avapro and HCTZ  Last OV was: 4/21/2020 with Dr. Baldwin  Labs/EKG: last CMP 12/26/2019  F/U scheduled: 6/3/2021 with Dr. Baldwin  New script sent to: [facility]    **Pt requests 30 day script as he is seeing Dr. Baldwin in 2 weeks.  KMortimer RN

## 2021-05-23 ENCOUNTER — HOSPITAL ENCOUNTER (OUTPATIENT)
Dept: LAB | Facility: CLINIC | Age: 71
Discharge: HOME | End: 2021-05-23
Attending: INTERNAL MEDICINE | Admitting: INTERNAL MEDICINE
Payer: MEDICARE

## 2021-05-23 DIAGNOSIS — Z11.59 ENCOUNTER FOR SCREENING FOR OTHER VIRAL DISEASES: ICD-10-CM

## 2021-05-23 LAB
LABORATORY COMMENT REPORT: NORMAL
SARS-COV-2 RNA RESP QL NAA+PROBE: NEGATIVE
SARS-COV-2 RNA RESP QL NAA+PROBE: NORMAL
SPECIMEN SOURCE: NORMAL
SPECIMEN SOURCE: NORMAL

## 2021-05-23 PROCEDURE — U0003 INFECTIOUS AGENT DETECTION BY NUCLEIC ACID (DNA OR RNA); SEVERE ACUTE RESPIRATORY SYNDROME CORONAVIRUS 2 (SARS-COV-2) (CORONAVIRUS DISEASE [COVID-19]), AMPLIFIED PROBE TECHNIQUE, MAKING USE OF HIGH THROUGHPUT TECHNOLOGIES AS DESCRIBED BY CMS-2020-01-R: HCPCS | Performed by: INTERNAL MEDICINE

## 2021-05-23 PROCEDURE — U0005 INFEC AGEN DETEC AMPLI PROBE: HCPCS | Performed by: INTERNAL MEDICINE

## 2021-05-26 ENCOUNTER — HOSPITAL ENCOUNTER (OUTPATIENT)
Facility: CLINIC | Age: 71
Discharge: HOME | End: 2021-05-26
Attending: INTERNAL MEDICINE | Admitting: INTERNAL MEDICINE
Payer: MEDICARE

## 2021-05-26 VITALS
WEIGHT: 170 LBS | TEMPERATURE: 97.9 F | OXYGEN SATURATION: 96 % | BODY MASS INDEX: 23.8 KG/M2 | SYSTOLIC BLOOD PRESSURE: 97 MMHG | DIASTOLIC BLOOD PRESSURE: 64 MMHG | RESPIRATION RATE: 16 BRPM | HEIGHT: 71 IN | HEART RATE: 52 BPM

## 2021-05-26 LAB — COLONOSCOPY: NORMAL

## 2021-05-26 PROCEDURE — 88305 TISSUE EXAM BY PATHOLOGIST: CPT | Mod: TC | Performed by: INTERNAL MEDICINE

## 2021-05-26 PROCEDURE — 88305 TISSUE EXAM BY PATHOLOGIST: CPT | Mod: 26 | Performed by: PATHOLOGY

## 2021-05-26 PROCEDURE — 250N000011 HC RX IP 250 OP 636: Performed by: INTERNAL MEDICINE

## 2021-05-26 PROCEDURE — 45380 COLONOSCOPY AND BIOPSY: CPT | Performed by: INTERNAL MEDICINE

## 2021-05-26 PROCEDURE — G0500 MOD SEDAT ENDO SERVICE >5YRS: HCPCS | Performed by: INTERNAL MEDICINE

## 2021-05-26 RX ORDER — PROCHLORPERAZINE MALEATE 5 MG
5 TABLET ORAL EVERY 6 HOURS PRN
Status: DISCONTINUED | OUTPATIENT
Start: 2021-05-26 | End: 2021-05-26 | Stop reason: HOSPADM

## 2021-05-26 RX ORDER — NALOXONE HYDROCHLORIDE 0.4 MG/ML
0.2 INJECTION, SOLUTION INTRAMUSCULAR; INTRAVENOUS; SUBCUTANEOUS
Status: DISCONTINUED | OUTPATIENT
Start: 2021-05-26 | End: 2021-05-26 | Stop reason: HOSPADM

## 2021-05-26 RX ORDER — FLUMAZENIL 0.1 MG/ML
0.2 INJECTION, SOLUTION INTRAVENOUS
Status: DISCONTINUED | OUTPATIENT
Start: 2021-05-26 | End: 2021-05-26 | Stop reason: HOSPADM

## 2021-05-26 RX ORDER — ONDANSETRON 2 MG/ML
4 INJECTION INTRAMUSCULAR; INTRAVENOUS
Status: DISCONTINUED | OUTPATIENT
Start: 2021-05-26 | End: 2021-05-26 | Stop reason: HOSPADM

## 2021-05-26 RX ORDER — NALOXONE HYDROCHLORIDE 0.4 MG/ML
0.4 INJECTION, SOLUTION INTRAMUSCULAR; INTRAVENOUS; SUBCUTANEOUS
Status: DISCONTINUED | OUTPATIENT
Start: 2021-05-26 | End: 2021-05-26 | Stop reason: HOSPADM

## 2021-05-26 RX ORDER — ONDANSETRON 4 MG/1
4 TABLET, ORALLY DISINTEGRATING ORAL EVERY 6 HOURS PRN
Status: DISCONTINUED | OUTPATIENT
Start: 2021-05-26 | End: 2021-05-26 | Stop reason: HOSPADM

## 2021-05-26 RX ORDER — LIDOCAINE 40 MG/G
CREAM TOPICAL
Status: DISCONTINUED | OUTPATIENT
Start: 2021-05-26 | End: 2021-05-26 | Stop reason: HOSPADM

## 2021-05-26 RX ORDER — ONDANSETRON 2 MG/ML
4 INJECTION INTRAMUSCULAR; INTRAVENOUS EVERY 6 HOURS PRN
Status: DISCONTINUED | OUTPATIENT
Start: 2021-05-26 | End: 2021-05-26 | Stop reason: HOSPADM

## 2021-05-26 RX ORDER — FENTANYL CITRATE 50 UG/ML
INJECTION, SOLUTION INTRAMUSCULAR; INTRAVENOUS PRN
Status: DISCONTINUED | OUTPATIENT
Start: 2021-05-26 | End: 2021-05-26 | Stop reason: HOSPADM

## 2021-05-26 RX ADMIN — FENTANYL CITRATE 50 MCG: 50 INJECTION, SOLUTION INTRAMUSCULAR; INTRAVENOUS at 07:34

## 2021-05-26 RX ADMIN — FENTANYL CITRATE 50 MCG: 50 INJECTION, SOLUTION INTRAMUSCULAR; INTRAVENOUS at 07:29

## 2021-05-26 RX ADMIN — MIDAZOLAM 1 MG: 1 INJECTION INTRAMUSCULAR; INTRAVENOUS at 07:34

## 2021-05-26 RX ADMIN — MIDAZOLAM 1 MG: 1 INJECTION INTRAMUSCULAR; INTRAVENOUS at 07:29

## 2021-05-26 ASSESSMENT — MIFFLIN-ST. JEOR: SCORE: 1563.24

## 2021-05-26 NOTE — H&P
Pre-Endoscopy History and Physical     Gonzalo Tucker MRN# [id number]   YOB: 1950 Age: 70 year old     Date of Procedure: 2021  Primary care provider: Regan Decker  Type of Endoscopy: Colonoscopy with possible biopsy, possible polypectomy  Reason for Procedure: screen  Type of Anesthesia Anticipated: Conscious Sedation    HPI:    Gonzalo is a 70 year old male who will be undergoing the above procedure.      A history and physical has been performed. The patient's medications and allergies have been reviewed. The risks and benefits of the procedure and the sedation options and risks were discussed with the patient.  All questions were answered and informed consent was obtained.      He denies a personal or family history of anesthesia complications or bleeding disorders.     Patient Active Problem List   Diagnosis     CAD (coronary artery disease)     Hyperlipidemia     HTN (hypertension)     BPH associated with nocturia     CAD S/P percutaneous coronary angioplasty     MI, old        Past Medical History:   Diagnosis Date     Angina pectoris (H)      CAD (coronary artery disease)     nonSTEMI, 2007 cath - JASON LAD     HTN (hypertension)      Hyperlipidemia      MI, old     nonSTEMI        Past Surgical History:   Procedure Laterality Date     CARDIAC SURGERY  2007    Stent       Social History     Tobacco Use     Smoking status: Never Smoker     Smokeless tobacco: Never Used   Substance Use Topics     Alcohol use: Yes     Alcohol/week: 0.0 standard drinks     Comment: 2+ beers a week       Family History   Problem Relation Age of Onset     Heart Disease Mother          from heart attack     Alzheimer Disease Mother      Diabetes Father      Heart Disease Father      Heart Disease Paternal Grandmother      Heart Disease Paternal Grandfather      Breast Cancer Sister 50     Other - See Comments Brother         AVM - atririal venous malfunction     Colon Cancer No family hx of   "      Prior to Admission medications    Medication Sig Start Date End Date Taking? Authorizing Provider   aspirin 81 MG tablet Take 81 mg by mouth every evening     Reported, Patient   atorvastatin (LIPITOR) 80 MG tablet Take 1 tablet (80 mg) by mouth daily 8/6/20   Kuldeep Baldwin MD   carvedilol (COREG) 12.5 MG tablet Take 1 tablet (12.5 mg) by mouth 2 times daily (with meals) 5/21/21   Kuldeep Baldwin MD   cetirizine (ZYRTEC) 10 MG tablet Take 10 mg by mouth 2 times daily     Unknown, Entered By History   cycloSPORINE modified (GENERIC EQUIVALENT) 100 MG capsule Take 100 mg by mouth 2 times daily    Unknown, Entered By History   hydrochlorothiazide (HYDRODIURIL) 25 MG tablet Take 1 tablet (25 mg) by mouth daily 5/21/21   Kuldeep Baldwin MD   irbesartan (AVAPRO) 150 MG tablet Take 2 tablets (300 mg) by mouth At Bedtime 5/21/21   Kuldeep Baldwin MD   multivitamin w/minerals (THERA-VIT-M) tablet Take 1 tablet by mouth daily    Unknown, Entered By History   nitroGLYcerin (NITROSTAT) 0.4 MG sublingual tablet Place 1 tablet (0.4 mg) under the tongue every 5 minutes as needed for chest pain  Patient not taking: Reported on 4/21/2020 5/6/19   Kuldeep Baldwin MD       Allergies   Allergen Reactions     Penicillins Rash        REVIEW OF SYSTEMS:   5 point ROS negative except as noted above in HPI, including Gen., Resp., CV, GI &  system review.    PHYSICAL EXAM:   There were no vitals taken for this visit. Estimated body mass index is 24.56 kg/m  as calculated from the following:    Height as of 5/6/19: 1.803 m (5' 11\").    Weight as of 12/27/19: 79.9 kg (176 lb 1.6 oz).   GENERAL APPEARANCE: alert, and oriented  MENTAL STATUS: alert  AIRWAY EXAM: Mallampatti Class I (visualization of the soft palate, fauces, uvula, anterior and posterior pillars)  RESP: lungs clear to auscultation - no rales, rhonchi or wheezes  CV: regular rates and rhythm  DIAGNOSTICS:    Not indicated    IMPRESSION   ASA " Class 2 - Mild systemic disease    PLAN:   Plan for Colonoscopy with possible biopsy, possible polypectomy. We discussed the risks, benefits and alternatives and the patient wished to proceed.    The above has been forwarded to the consulting provider.      Signed Electronically by: Antwon Yung MD  May 26, 2021

## 2021-05-26 NOTE — LETTER
April 29, 2021      Gonzalo Tucker  [address]  [facility] [address]        Dear Gonzalo,     Please be aware that coverage of these services is subject to the terms and limitations of your health insurance plan.  Call member services at your health plan with any benefit or coverage questions.    Thank you for choosing [facility]. You are scheduled for the following service(s):    Date:  5/26/2021             Procedure:  COLONOSCOPY  Doctor:        Aga   Arrival Time:  7:00 am *Enter and check in at the Main Hospital Entrance*  Procedure Time:  7:30 am      Location:   [facility]        Endoscopy Department, First Floor         [address] [place], [place] [address] [phone] or [phone] ([facility]) to reschedule      MIRALAX -GATORADE  PREP  Colonoscopy is the most accurate test to detect colon polyps and colon cancer; and the only test where polyps can be removed. During this procedure, a doctor examines the lining of your large intestine and rectum through a flexible tube.   Transportation  You must arrange for a ride for the day of your procedure with a responsible adult. A taxi , Uber, etc, is not an option unless you are accompanied by a responsible adult. If you fail to arrange transportation with a responsible adult, your procedure will be cancelled and rescheduled.    Purchase the  following supplies at your local pharmacy:  - 2 (two) bisacodyl tablets: each tablet contains 5 mg.  (Dulcolax  laxative NOT Dulcolax  stool softener)   - 1 (one) 8.3 oz bottle of Polyethylene Glycol (PEG) 3350 Powder   (MiraLAX , Smooth LAX , ClearLAX  or equivalent)  - 64 oz Gatorade    Regular Gatorade, Gatorade G2 , Powerade , Powerade Zero  or Pedialyte  is acceptable. Red colored flavors are not allowed; all other colors (yellow, green, orange, purple and blue) are okay. It is also okay to buy two 2.12 oz packets of powdered Gatorade  that can be mixed with water to a total volume of 64 oz of liquid.  - 1 (one) 10 oz bottle of Magnesium Citrate (Red colored flavors are not allowed)  It is also okay for you to use a 0.5 oz package of powdered magnesium citrate (17 g) mixed with 10 oz of water.      PREPARATION FOR COLONOSCOPY    7 days before:    Discontinue fiber supplements and medications containing iron. This includes Metamucil  and Fibercon ; and multivitamins with iron.    3 days before:    Begin a low-fiber diet. A low-fiber diet helps making the cleanout more effective.     Examples of a low-fiber diet include (but are not limited to): white bread, white rice, pasta, crackers, fish, chicken, eggs, ground beef, creamy peanut butter, cooked/steamed/boiled vegetables, canned fruit, bananas, melons, milk, plain yogurt cheese, salad dressing and other condiments.     The following are not allowed on a low-fiber diet: seeds, nuts, popcorn, bran, whole wheat, corn, quinoa, raw fruits and vegetables, berries and dried fruit, beans and lentils.    For additional details on low-fiber diet, please refer to the table on the last page.    2 days before:    Continue the low-fiber diet.     Drink at least 8 glasses of water throughout the day.     Stop eating solid foods at 11:45 pm.    1 day before:    In the morning: begin a clear liquid diet (liquids you can see through).     Examples of a clear liquid diet include: water, clear broth or bouillon, Gatorade, Pedialyte or Powerade, carbonated and non-carbonated soft drinks (Sprite , 7-Up , ginger ale), strained fruit juices without pulp (apple, white grape, white cranberry), Jell-O  and popsicles.     The following are not allowed on a clear liquid diet: red liquids, alcoholic beverages, dairy products (milk, creamer, and yogurt), protein shakes, creamy broths, juice with pulp and chewing tobacco.    At noon: take 2 (two) bisacodyl tablets     At 4 (and no later than 6pm): start drinking the  Miralax-Gatorade preparation (8.3 oz of Miralax mixed with 64 oz of Gatorade in a large pitcher). Drink 1(one) 8 oz glass every 15 minutes thereafter, until the mixture is gone.    COLON CLEANSING TIPS: drink adequate amounts of fluids before and after your colon cleansing to prevent dehydration. Stay near a toilet because you will have diarrhea. Even if you are sitting on the toilet, continue to drink the cleansing solution every 15 minutes. If you feel nauseous or vomit, rinse your mouth with water, take a 15 to 30-minute-break and then continue drinking the solution. You will be uncomfortable until the stool has flushed from your colon (in about 2 to 4 hours). You may feel chilled.    Day of your procedure  You may take all of your morning medications including blood pressure medications, blood thinners (if you have not been instructed to stop these by our office), methadone, anti-seizure medications with sips of water 3 hours prior to your procedure or earlier. Do not take insulin or vitamins prior to your procedure. Continue the clear liquid diet.       4 hours prior: drink 10 oz of magnesium citrate. It may be easier to drink it with a straw.    STOP consuming all liquids after that.     Do not take anything by mouth during this time.     Allow extra time to travel to your procedure as you may need to stop and use a restroom along the way.    You are ready for the procedure, if you followed all instructions and your stool is no longer formed, but clear or yellow liquid. If you are unsure whether your colon is clean, please call our office at [phone] before you leave for your appointment.    Bring the following to your procedure:  - Insurance Card/Photo ID.   - List of current medications including over-the-counter medications and supplements.   - Your rescue inhaler if you currently use one to control asthma.    Canceling or rescheduling your appointment:   If you must cancel or reschedule your  appointment, please call [phone] as soon as possible.      COLONOSCOPY PRE-PROCEDURE CHECKLIST    If you have diabetes, ask your regular doctor for diet and medication restrictions.  If you take an anticoagulant or anti-platelet medication (such as Coumadin , Lovenox , Pradaxa , Xarelto , Eliquis , etc.), please call your primary doctor for advice on holding this medication.  If you take aspirin you may continue to do so.  If you are or may be pregnant, please discuss the risks and benefits of this procedure with your doctor.        What happens during a colonoscopy?    Plan to spend up to two hours, starting at registration time, at the endoscopy center the day of your procedure. The colonoscopy takes an average of 15 to 30 minutes. Recovery time is about 30 minutes.      Before the exam:    You will change into a gown.    Your medical history and medication list will be reviewed with you, unless that has been done over the phone prior to the procedure.     A nurse will insert an intravenous (IV) line into your hand or arm.    The doctor will meet with you and will give you a consent form to sign.  During the exam:     Medicine will be given through the IV line to help you relax.     Your heart rate and oxygen levels will be monitored. If your blood pressure is low, you may be given fluids through the IV line.     The doctor will insert a flexible hollow tube, called a colonoscope, into your rectum. The scope will be advanced slowly through the large intestine (colon).    You may have a feeling of fullness or pressure.     If an abnormal tissue or a polyp is found, the doctor may remove it through the endoscope for closer examination, or biopsy. Tissue removal is painless    After the exam:           Any tissue samples removed during the exam will be sent to a lab for evaluation. It may take 5-7 working days for you to be notified of the results.     A nurse will provide you with complete discharge  instructions before you leave the endoscopy center. Be sure to ask the nurse for specific instructions if you take blood thinners such as Aspirin, Coumadin or Plavix.     The doctor will prepare a full report for you and for the physician who referred you for the procedure.     Your doctor will talk with you about the initial results of your exam.      Medication given during the exam will prohibit you from driving for the rest of the day.     Following the exam, you may resume your normal diet. Your first meal should be light, no greasy foods. Avoid alcohol until the next day.     You may resume your regular activities the day after the procedure.         LOW-FIBER DIET    Foods RECOMMENDED Foods to AVOID   Breads, Cereal, Rice and Pasta:   White bread, rolls, biscuits, croissant and omero toast.   Waffles, Sinhala toast and pancakes.   White rice, noodles, pasta, macaroni and peeled cooked potatoes.   Plain crackers and saltines.   Cooked cereals: farina, cream of rice.   Cold cereals: Puffed Rice , Rice Krispies , Corn Flakes  and Special K    Breads, Cereal, Rice and Pasta:   Breads or rolls with nuts, seeds or fruit.   Whole wheat, pumpernickel, rye breads and cornbread.   Potatoes with skin, brown or wild rice, and kasha (buckwheat).     Vegetables:   Tender cooked and canned vegetables without seeds: carrots, asparagus tips, green or wax beans, pumpkin, spinach, lima beans. Vegetables:   Raw or steamed vegetables.   Vegetables with seeds.   Sauerkraut.   Winter squash, peas, broccoli, Brussel sprouts, cabbage, onions, cauliflower, baked beans, peas and corn.   Fruits:   Strained fruit juice.   Canned fruit, except pineapple.   Ripe bananas and melon. Fruits:   Prunes and prune juice.   Raw fruits.   Dried fruits: figs, dates and raisins.   Milk/Dairy:   Milk: plain or flavored.   Yogurt, custard and ice cream.   Cheese and cottage cheese Milk/Dairy:     Meat and other proteins:   ground, well-cooked tender  beef, lamb, ham, veal, pork, fish, poultry and organ meats.   Eggs.   Peanut butter without nuts. Meat and other proteins:   Tough, fibrous meats with gristle.   Dry beans, peas and lentils.   Peanut butter with nuts.   Tofu.   Fats, Snack, Sweets, Condiments and Beverages:   Margarine, butter, oils, mayonnaise, sour cream and salad dressing, plain gravy.   Sugar, hard candy, clear jelly, honey and syrup.   Spices, cooked herbs, bouillon, broth and soups made with allowed vegetable, ketchup and mustard.   Coffee, tea and carbonated drinks.   Plain cakes, cookies and pretzels.   Gelatin, plain puddings, custard, ice cream, sherbet and popsicles. Fats, Snack, Sweets, Condiments and Beverages:   Nuts, seeds and coconut.   Jam, marmalade and preserves.   Pickles, olives, relish and horseradish.   All desserts containing nuts, seeds, dried fruit and coconut; or made from whole grains or bran.   Candy made with nuts or seeds.   Popcorn.         DIRECTIONS TO THE ENDOSCOPY DEPARTMENT    From the north ([facility])  Take [address] South, exit on [address] [address]. Get into the left hand darcy, turn left (east), go one-half mile to [address] and turn left. Go north to the second stoplight, take a right on [address] and follow it to the Main Hospital entrance.    From the south ([facility])  Take 35N to the [address]E split and exit on [address] [address]. On [address] [address], turn left (west) to [address]. Turn right (north) on [address]. Go north to the second stoplight, take a right on [address] and follow it to the Main Hospital entrance.    From the east via 35E ([facility])  Take [address]E south to [address] [address] exit. Turn right on [address] [address]. Go west to [address]. Turn right (north) on [address]. Go to the second stoplight, take a right on [address] to the Main Hospital entrance.    From the east via [address] ([facility])  Take [facility] [address] to [address]. Turn left (south) on [address] to [address], turn left (east) on [address] and follow it to the Main Hospital entrance.    From the west via [address] ([place], [place])  Take [address] east to [address]. Turn right (south) on [address] to [address], turn left (east) on [address] and follow it to the Main Hospital entrance.

## 2021-05-27 LAB — COPATH REPORT: NORMAL

## 2021-06-03 ENCOUNTER — OFFICE VISIT (OUTPATIENT)
Dept: CARDIOLOGY | Facility: CLINIC | Age: 71
End: 2021-06-03
Payer: MEDICARE

## 2021-06-03 VITALS
BODY MASS INDEX: 23.53 KG/M2 | DIASTOLIC BLOOD PRESSURE: 72 MMHG | WEIGHT: 168.1 LBS | SYSTOLIC BLOOD PRESSURE: 128 MMHG | HEIGHT: 71 IN | HEART RATE: 56 BPM

## 2021-06-03 DIAGNOSIS — I25.10 CORONARY ARTERY DISEASE INVOLVING NATIVE CORONARY ARTERY OF NATIVE HEART WITHOUT ANGINA PECTORIS: ICD-10-CM

## 2021-06-03 DIAGNOSIS — E78.2 MIXED HYPERLIPIDEMIA: ICD-10-CM

## 2021-06-03 DIAGNOSIS — I10 BENIGN ESSENTIAL HYPERTENSION: ICD-10-CM

## 2021-06-03 PROCEDURE — 99214 OFFICE O/P EST MOD 30 MIN: CPT | Performed by: INTERNAL MEDICINE

## 2021-06-03 RX ORDER — IRBESARTAN 150 MG/1
300 TABLET ORAL AT BEDTIME
Qty: 60 TABLET | Refills: 0 | Status: SHIPPED | OUTPATIENT
Start: 2021-06-03 | End: 2021-06-14

## 2021-06-03 RX ORDER — HYDROCHLOROTHIAZIDE 25 MG/1
25 TABLET ORAL DAILY
Qty: 30 TABLET | Refills: 0 | Status: SHIPPED | OUTPATIENT
Start: 2021-06-03 | End: 2021-11-01

## 2021-06-03 RX ORDER — CARVEDILOL 12.5 MG/1
12.5 TABLET ORAL 2 TIMES DAILY WITH MEALS
Qty: 60 TABLET | Refills: 0 | Status: SHIPPED | OUTPATIENT
Start: 2021-06-03 | End: 2021-06-14

## 2021-06-03 RX ORDER — ATORVASTATIN CALCIUM 80 MG/1
80 TABLET, FILM COATED ORAL DAILY
Qty: 90 TABLET | Refills: 3 | Status: SHIPPED | OUTPATIENT
Start: 2021-06-03 | End: 2022-07-19

## 2021-06-03 ASSESSMENT — MIFFLIN-ST. JEOR: SCORE: 1554.63

## 2021-06-03 NOTE — LETTER
6/3/2021    Regan Archuleta MD  [facility] [address] [facility] [address]    RE: Gonzalo Tucker       Dear Colleague,    I had the pleasure of seeing Gonzalo HASMUKH Tucker in the [facility].    HISTORY OF PRESENT ILLNESS:  Chronic hives etiology unknown. Currently taking Zertec staying off cyclosporin. Cataract surgery planned.exercising has lost wt.     Orders this Visit:  No orders of the defined types were placed in this encounter.    No orders of the defined types were placed in this encounter.    There are no discontinued medications.    No diagnosis found.    CURRENT MEDICATIONS:  Current Outpatient Medications   Medication Sig Dispense Refill     aspirin 81 MG tablet Take 81 mg by mouth every evening        atorvastatin (LIPITOR) 80 MG tablet Take 1 tablet (80 mg) by mouth daily 90 tablet 3     carvedilol (COREG) 12.5 MG tablet Take 1 tablet (12.5 mg) by mouth 2 times daily (with meals) 60 tablet 0     cetirizine (ZYRTEC) 10 MG tablet Take 10 mg by mouth 2 times daily        hydrochlorothiazide (HYDRODIURIL) 25 MG tablet Take 1 tablet (25 mg) by mouth daily 30 tablet 0     irbesartan (AVAPRO) 150 MG tablet Take 2 tablets (300 mg) by mouth At Bedtime 60 tablet 0     multivitamin w/minerals (THERA-VIT-M) tablet Take 1 tablet by mouth daily       nitroGLYcerin (NITROSTAT) 0.4 MG sublingual tablet Place 1 tablet (0.4 mg) under the tongue every 5 minutes as needed for chest pain 25 tablet 12     cycloSPORINE modified (GENERIC EQUIVALENT) 100 MG capsule Take 100 mg by mouth 2 times daily         ALLERGIES     Allergies   Allergen Reactions     Pcn [Penicillins] Rash       PAST MEDICAL, SURGICAL, FAMILY, SOCIAL HISTORY:  History was reviewed and updated as needed, see medical record.    Review of Systems:  A 12-point review of systems was completed, see medical record for detailed review of systems information.    Physical Exam:  Vitals: BP  "128/72   Pulse 56   Ht 1.803 m (5' 11\")   Wt 76.2 kg (168 lb 1.6 oz)   BMI 23.45 kg/m      Constitutional:           Skin:           Head:           Eyes:           ENT:           Neck:           Chest:           Cardiac:                    Abdomen:           Vascular:                                        Extremities and Back:           Neurological:           ASSESSMENT: stable       RECOMMENDATIONS: follow-up in one year      Recent Lab Results:  LIPID RESULTS:  Lab Results   Component Value Date    CHOL 153 12/26/2017    HDL 37 (L) 12/26/2017    LDL 84 12/26/2017    TRIG 161 (H) 12/26/2017    CHOLHDLRATIO 3.1 11/11/2015       LIVER ENZYME RESULTS:  Lab Results   Component Value Date    AST 36 12/26/2019    ALT 50 12/26/2019       CBC RESULTS:  Lab Results   Component Value Date    WBC 6.3 12/26/2019    RBC 5.02 12/26/2019    HGB 14.6 12/26/2019    HCT 43.6 12/26/2019    MCV 87 12/26/2019    MCH 29.1 12/26/2019    MCHC 33.5 12/26/2019    RDW 14.1 12/26/2019     12/26/2019       BMP RESULTS:  Lab Results   Component Value Date     12/26/2019    POTASSIUM 4.2 12/26/2019    CHLORIDE 106 12/26/2019    CO2 31 12/26/2019    ANIONGAP 3 12/26/2019    GLC 89 12/26/2019    BUN 41 (H) 12/26/2019    CR 1.11 12/26/2019    GFRESTIMATED 68 12/26/2019    GFRESTBLACK 79 12/26/2019    ESPERANZA 9.3 12/26/2019        A1C RESULTS:  No results found for: A1C    INR RESULTS:  Lab Results   Component Value Date    INR 1.04 02/10/2008    INR 0.97 08/25/2007       We greatly appreciate the opportunity to be involved in the care of your patient, Gonzalo Tucker.    Sincerely,  Kuldeep Baldwin MD      CC  No referring provider defined for this encounter.  "

## 2021-06-03 NOTE — PROGRESS NOTES
"HISTORY OF PRESENT ILLNESS:  Chronic hives etiology unknown. Currently taking Zertec staying off cyclosporin. Cataract surgery planned.exercising has lost wt.     Orders this Visit:  No orders of the defined types were placed in this encounter.    No orders of the defined types were placed in this encounter.    There are no discontinued medications.    No diagnosis found.    CURRENT MEDICATIONS:  Current Outpatient Medications   Medication Sig Dispense Refill     aspirin 81 MG tablet Take 81 mg by mouth every evening        atorvastatin (LIPITOR) 80 MG tablet Take 1 tablet (80 mg) by mouth daily 90 tablet 3     carvedilol (COREG) 12.5 MG tablet Take 1 tablet (12.5 mg) by mouth 2 times daily (with meals) 60 tablet 0     cetirizine (ZYRTEC) 10 MG tablet Take 10 mg by mouth 2 times daily        hydrochlorothiazide (HYDRODIURIL) 25 MG tablet Take 1 tablet (25 mg) by mouth daily 30 tablet 0     irbesartan (AVAPRO) 150 MG tablet Take 2 tablets (300 mg) by mouth At Bedtime 60 tablet 0     multivitamin w/minerals (THERA-VIT-M) tablet Take 1 tablet by mouth daily       nitroGLYcerin (NITROSTAT) 0.4 MG sublingual tablet Place 1 tablet (0.4 mg) under the tongue every 5 minutes as needed for chest pain 25 tablet 12     cycloSPORINE modified (GENERIC EQUIVALENT) 100 MG capsule Take 100 mg by mouth 2 times daily         ALLERGIES     Allergies   Allergen Reactions     Pcn [Penicillins] Rash       PAST MEDICAL, SURGICAL, FAMILY, SOCIAL HISTORY:  History was reviewed and updated as needed, see medical record.    Review of Systems:  A 12-point review of systems was completed, see medical record for detailed review of systems information.    Physical Exam:  Vitals: /72   Pulse 56   Ht 1.803 m (5' 11\")   Wt 76.2 kg (168 lb 1.6 oz)   BMI 23.45 kg/m      Constitutional:           Skin:           Head:           Eyes:           ENT:           Neck:           Chest:           Cardiac:                    Abdomen:       "     Vascular:                                        Extremities and Back:           Neurological:           ASSESSMENT: stable       RECOMMENDATIONS: follow-up in one year      Recent Lab Results:  LIPID RESULTS:  Lab Results   Component Value Date    CHOL 153 12/26/2017    HDL 37 (L) 12/26/2017    LDL 84 12/26/2017    TRIG 161 (H) 12/26/2017    CHOLHDLRATIO 3.1 11/11/2015       LIVER ENZYME RESULTS:  Lab Results   Component Value Date    AST 36 12/26/2019    ALT 50 12/26/2019       CBC RESULTS:  Lab Results   Component Value Date    WBC 6.3 12/26/2019    RBC 5.02 12/26/2019    HGB 14.6 12/26/2019    HCT 43.6 12/26/2019    MCV 87 12/26/2019    MCH 29.1 12/26/2019    MCHC 33.5 12/26/2019    RDW 14.1 12/26/2019     12/26/2019       BMP RESULTS:  Lab Results   Component Value Date     12/26/2019    POTASSIUM 4.2 12/26/2019    CHLORIDE 106 12/26/2019    CO2 31 12/26/2019    ANIONGAP 3 12/26/2019    GLC 89 12/26/2019    BUN 41 (H) 12/26/2019    CR 1.11 12/26/2019    GFRESTIMATED 68 12/26/2019    GFRESTBLACK 79 12/26/2019    ESPERANZA 9.3 12/26/2019        A1C RESULTS:  No results found for: A1C    INR RESULTS:  Lab Results   Component Value Date    INR 1.04 02/10/2008    INR 0.97 08/25/2007       We greatly appreciate the opportunity to be involved in the care of your patient, Gonzalo Tucker.    Sincerely,  Kuldeep Baldwin MD      CC  No referring provider defined for this encounter.

## 2021-06-03 NOTE — PROGRESS NOTES
Service Date: 06/03/2021    HISTORY OF PRESENT ILLNESS:  Gonzalo Tucker, a 70-year-old man with coronary artery disease, dyslipidemia, hypertension, and idiopathic urticaria was seen today at your request for followup.    Since last seen, Mr. Tucker remains asymptomatic and specifically denies chest, arm, neck, jaw or back discomfort with activity.  He has been exercising on a regular basis and has lost approximately 12 pounds.  He is looking forward to his daughter's wedding  this summer.    PAST MEDICAL HISTORY:    1.  Idiopathic urticaria -- followed by Dermatology.  On Zyrtec.  2.  Hypertension.  3.  Dyslipidemia.  4.  Coronary artery disease.    a.  Non-ST segment elevation MI 2007.  b.  History of stent implantation in proximal LAD.  Right coronary, circumflex left main with no significant narrowing.  c.  Negative nuclear stress test 2019.    PHYSICAL EXAMINATION:    GENERAL:  On exam today demonstrates a very pleasant, cooperative, and intelligent 70-year-old man.  VITAL SIGNS:  His blood pressure was 128/72, his heart rate is 56.  His height is 1.8 meters, his weight is 76.2 kilograms.  His BMI is 23.4.  RESPIRATORY:  His lungs are clear to percussion and auscultation.  CARDIOVASCULAR:  Shows a normal S1 with a normal S2.  There is no S3.  There is no murmur, rub or click.  His pulses are full and symmetrical.    LABORATORY STUDIES:  His most recent lipid profile in your office showed an LDL cholesterol of 76.  His creatinine is 0.98.    MEDICATIONS:    1.  Aspirin 81 daily.  2.  Atorvastatin 80 daily.  3.  Carvedilol 12.5 b.i.d.  4.  Hydrochlorothiazide 25 daily.  5.  Irbesartan 150 daily.  6.  Cetirizine (Zyrtec) 10 mg b.i.d.    ASSESSMENT:  Mr. Tucker remains asymptomatic with optimal systolic blood pressure and LDL cholesterol levels on guideline-directed medical therapy.  He follows a prudent diet and regular exercise program.    RECOMMENDATIONS:    1.  Continue present therapy.  2.  Followup visit  in about 1 year.    We greatly appreciate the opportunity to care for your patient, Mr. Gonzalo Courtney.    cc:  Regan Decker MD  [facility]  [address] [place]  [address]        Kuldeep Baldwin MD        D: 2021   T: 2021   MT: semaj    Name:     GONZALO COURTNEY  MRN:      [id number]        Account:      [id number]   :      1950           Service Date: 2021       Document: [id number]

## 2021-06-14 DIAGNOSIS — E78.2 MIXED HYPERLIPIDEMIA: ICD-10-CM

## 2021-06-14 DIAGNOSIS — I10 BENIGN ESSENTIAL HYPERTENSION: ICD-10-CM

## 2021-06-14 DIAGNOSIS — I25.10 CORONARY ARTERY DISEASE INVOLVING NATIVE CORONARY ARTERY OF NATIVE HEART WITHOUT ANGINA PECTORIS: ICD-10-CM

## 2021-06-14 RX ORDER — CARVEDILOL 12.5 MG/1
12.5 TABLET ORAL 2 TIMES DAILY WITH MEALS
Qty: 180 TABLET | Refills: 3 | Status: SHIPPED | OUTPATIENT
Start: 2021-06-14 | End: 2022-06-24

## 2021-06-14 RX ORDER — IRBESARTAN 150 MG/1
300 TABLET ORAL AT BEDTIME
Qty: 180 TABLET | Refills: 3 | Status: SHIPPED | OUTPATIENT
Start: 2021-06-14 | End: 2022-06-15

## 2021-06-14 NOTE — TELEPHONE ENCOUNTER
Received refill request for:  Carvedilol and Irbesartan  Last OV was: 6/3/2021 with Dr. Baldwin  Labs/EKG: last BMP 2/24/2021  F/U scheduled: orders in Epic for 6/2022  New script sent to: [facility]

## 2021-06-25 NOTE — LETTER
5/6/2019    Regan Archuleta MD  [facility] [address] [facility] [address]    RE: Gonzalo Tucker       Dear Colleague,    I had the pleasure of seeing Gonzalo Tucker in the [facility].    HISTORY OF PRESENT ILLNESS:  Intermittent maculopapular since February. Responsive steroids and benadryl. Has not yet seen PMD. Has not checked bp with his machine recently, but elevated with vistis in [place].     Orders this Visit:  No orders of the defined types were placed in this encounter.    No orders of the defined types were placed in this encounter.    There are no discontinued medications.    Encounter Diagnosis   Name Primary?     Benign essential hypertension        CURRENT MEDICATIONS:  Current Outpatient Medications   Medication Sig Dispense Refill     Acetaminophen (TYLENOL 8 HOUR PO) Take by mouth as needed        aspirin 81 MG tablet Take 81 mg by mouth daily       atorvastatin (LIPITOR) 80 MG tablet Take 1 tablet (80 mg) by mouth daily 90 tablet 0     carvedilol (COREG) 6.25 MG tablet Take 2 tablets (12.5 mg) by mouth 2 times daily (with meals) 360 tablet 3     diphenhydrAMINE-APAP, sleep, (TYLENOL PM EXTRA STRENGTH)  MG/30ML LIQD Take by mouth as needed        hydrochlorothiazide (HYDRODIURIL) 12.5 MG tablet Take 2 tablets (25 mg) by mouth daily 180 tablet 0     Ibuprofen-Diphenhydramine Cit (ADVIL PM) 200-38 MG TABS Take by mouth every evening as needed       irbesartan (AVAPRO) 150 MG tablet Take 2 tablets (300 mg) by mouth At Bedtime 180 tablet 3     Multiple vitamin TABS Take by mouth daily        nitroGLYcerin (NITROSTAT) 0.4 MG sublingual tablet Place 1 tablet (0.4 mg) under the tongue every 5 minutes as needed for chest pain 25 tablet 12     tamsulosin (FLOMAX) 0.4 MG capsule Take by mouth daily         ALLERGIES     Allergies   Allergen Reactions     Penicillins Rash       PAST MEDICAL, SURGICAL, FAMILY, SOCIAL HISTORY:  History was reviewed  "and updated as needed, see medical record.    Review of Systems:  A 12-point review of systems was completed, see medical record for detailed review of systems information.    Physical Exam:  Vitals: /86   Pulse 66   Ht 1.803 m (5' 11\")   Wt 81.7 kg (180 lb 3.2 oz)   BMI 25.13 kg/m       Constitutional:           Skin:           Head:           Eyes:           ENT:           Neck:           Chest:           Cardiac:                    Abdomen:           Vascular:                                        Extremities and Back:           Neurological:           ASSESSMENT:  BP appears  Well controlled in clinic. ? Rash is drug related or not       RECOMMENDATIONS:   Dermatology evaluation, monitor bp      Recent Lab Results:  LIPID RESULTS:  Lab Results   Component Value Date    CHOL 153 12/26/2017    HDL 37 (L) 12/26/2017    LDL 84 12/26/2017    TRIG 161 (H) 12/26/2017    CHOLHDLRATIO 3.1 11/11/2015       LIVER ENZYME RESULTS:  Lab Results   Component Value Date    AST 36 08/30/2016    ALT 43 12/02/2016       CBC RESULTS:  Lab Results   Component Value Date    WBC 9.7 08/30/2016    RBC 5.42 08/30/2016    HGB 15.4 08/30/2016    HCT 46.7 08/30/2016    MCV 86 08/30/2016    MCH 28.4 08/30/2016    MCHC 33.0 08/30/2016    RDW 13.4 08/30/2016     08/30/2016       BMP RESULTS:  Lab Results   Component Value Date     08/30/2016    POTASSIUM 4.1 08/30/2016    CHLORIDE 106 08/30/2016    CO2 28 08/30/2016    ANIONGAP 7 08/30/2016     (H) 08/30/2016    BUN 22 08/30/2016    CR 0.91 08/30/2016    GFRESTIMATED 84 08/30/2016    GFRESTBLACK >90   GFR Calc   08/30/2016    ESPERANZA 8.8 08/30/2016        A1C RESULTS:  No results found for: A1C    INR RESULTS:  Lab Results   Component Value Date    INR 1.04 02/10/2008    INR 0.97 08/25/2007       We greatly appreciate the opportunity to be involved in the care of your patient, Gonzalo Tucker.    Sincerely,  Kuldeep Baldwin MD      [facility], [facility]1983 SOFÍA " Continue pressure support Likely from cardiogenic shock and bumex drip TUCKER Mosquera  [address] [place] [address]                                                                       Service Date: 2019      Regan Decker MD   [facility]   [address] [place] [address]      RE: Gonzalo Tucker   MRN: [id number]   : 1950      Dear Dr. Decker:      Gonzalo Tucker, a 68-year-old man with coronary artery disease, dyslipidemia and primary (essential) hypertension was seen today at your request for followup.      Since 2019, Mr. Tucker has been troubled by a pruritic erythematous rash.  He describes 3 separate episodes of rash accompanied by pruritus that responded to Benadryl and steroids.  He has been on his current antihypertensive regimen since 2018 without any change in medications.  He has been unable to identify a precipitating factor.  He is scheduled to see his primary care physician in the near future.  He has not been checking his blood pressures at home, but his initial systolic pressure was 150 in the clinic.  After having the patient sit and rest, a repeat systolic pressure was 129.      The patient remains free of chest, arm, neck, jaw or back discomfort with exertion.  He exercises on a regular basis.  He has gained about 3 pounds since we last saw him.      PHYSICAL EXAMINATION:   GENERAL:  Exam today demonstrates a very pleasant and cooperative 68-year-old man.   VITAL SIGNS:  His blood pressure was 129/70, his heart rate was 66.  His height is 1.8 meters, his weight is 81.7 kg.  His BMI is 25.13.   LUNGS:  Clear to percussion and auscultation.   CARDIOVASCULAR:  Shows a normal S1 with a normal S2, there is no S3.  There is no murmur, rub or click.      He has a maculopapular erythematous rash involving his back and extensor surfaces.      ASSESSMENT:  Mr. Tucker's systolic blood pressure is adequately controlled at this time, but will require surveillance.  He will benefit from a weight loss Mediterranean style diet  with exercise.      I am uncertain as to the cause of the patient's rash.  I cannot entirely exclude the possibility that the rash is related to his medical therapy, although he has been on the same blood pressure medications for at least a year.  I believe formal Dermatology evaluation is required in view of the recurrent and refractory nature of his symptoms and the question as to whether there is some relationship between the symptoms and his drugs.      RECOMMENDATIONS:   1.  I have asked the patient to watch his blood pressure carefully at home and record the numbers.  If he is consistently above 130, we may need to consider changing the regimen.   2.  Mediterranean style weight loss diet.   3.  Regular exercise, 40 minutes 4-7 times a week.   4.  Formal Dermatology consultation to assess whether the patient's rash is on the basis of his current medical therapy.  If necessary, we could consider using amlodipine and/or spironolactone.   5.  Followup visit with me in about 1 year, earlier if any new questions or concerns.      We have appreciated the opportunity to care for your patient, Mr. Gonzalo Courtney.      Sincerely,         MD KULDEEP Whittaker MD             D: 2019   T: 2019   MT: al      Name:     GONZALO COURTNEY   MRN:      [id number]        Account:      [id number]   :      1950           Service Date: 2019      Document: [id number]         Thank you for allowing me to participate in the care of your patient.      Sincerely,     Kuldeep Baldwin MD     [facility]    cc:   Carlos Mosquera PA-C  [address] [place] [address]         Resolved Resolved Pressure support prn Continue pressure support Resolved Pressure support prn

## 2021-11-01 ENCOUNTER — TELEPHONE (OUTPATIENT)
Dept: CARDIOLOGY | Facility: CLINIC | Age: 71
End: 2021-11-01

## 2021-11-01 DIAGNOSIS — I10 BENIGN ESSENTIAL HYPERTENSION: ICD-10-CM

## 2021-11-01 DIAGNOSIS — I25.10 CORONARY ARTERY DISEASE INVOLVING NATIVE CORONARY ARTERY OF NATIVE HEART WITHOUT ANGINA PECTORIS: ICD-10-CM

## 2021-11-01 RX ORDER — HYDROCHLOROTHIAZIDE 25 MG/1
25 TABLET ORAL DAILY
Qty: 90 TABLET | Refills: 3 | Status: SHIPPED | OUTPATIENT
Start: 2021-11-01 | End: 2022-07-19

## 2021-11-01 RX ORDER — AMLODIPINE BESYLATE 5 MG/1
5 TABLET ORAL DAILY
COMMUNITY
End: 2022-07-19

## 2021-11-01 RX ORDER — NITROGLYCERIN 0.4 MG/1
0.4 TABLET SUBLINGUAL EVERY 5 MIN PRN
Qty: 25 TABLET | Refills: 3 | Status: SHIPPED | OUTPATIENT
Start: 2021-11-01 | End: 2022-07-19

## 2021-11-01 NOTE — TELEPHONE ENCOUNTER
"Received VM from patient stating he needs refills on hydrochlorothiazide and nitroglycerin. Pt also states he wants to discuss a medication he is taking for Hives that is causing high BP.     Spoke w/ pt. States he just wanted  to know his PMD has been making some changes in medication. BP was running high at PMDs. Pt was started on amlodipine 5 mg daily. Pt thinks Cyclosporine is possibly causing high BP, he stopped this and is going to follow up with his Allergy doctor. FYI sent to . [facility]       ADDENDUM: See reply from  below. Pt aware he should continue follow up with PMD.     Kuldeep Baldwin MD  You       \"Thanks Rachel. Cyclosporine can raise blood pressure, but he should discuss with his doctor as to how necessary the drug is and as to if there are alternatives. \"          "

## 2022-02-15 NOTE — LETTER
2018    Regan Archuleta MD  [facility] [address] [facility] [address]    RE: Gonzalo Tucker       Dear Colleague,    I had the pleasure of seeing Gonzalo Tucker in the [facility].    CARDIOLOGY CLINIC PROGRESS NOTE    DOS: 18    Gonzalo Tucker  : 1950, 68 year old  MRN: [id number]      History:   I had the pleasure of following up with Gonzalo Tucker today regarding his blood pressure.  He is patient of Dr. Baldwin.     Gonzalo is a pleasant 68-year-old man with hypertension, coronary artery disease, and dyslipidemia.        He has a longstanding history of hypertension.  He noted that over the last year, his systolic blood pressures had averaged about 150 mmHg.  At that point, Dr. Baldwin began to make changes in his medical regimen.  He was switched from metoprolol to carvedilol.  He continued on maximal doses of irbesartan and hydrochlorothiazide.     He saw Dr. Baldwin 18.  His BPs were still running in the 150 range at times.  Dr. Baldwin increased carvedilol from 6.25 mg BID to 12.5 mg BID.      In follow up, he was concerned with elevated readings at home. We did compare his monitor to our cuff, and it was accurate.  Therefore he wore a 24 Hour Ambulatory monitor.  This showed excellent BP control.  97% of the time BP controlled. Average was 116/70.     He presents today for follow up.   He is pleased with the numbers.   No cardiac concerns.   He is tolerating his meds.      ROS:  Skin:  Negative     Eyes:  Positive for glasses;cataracts  ENT:  Negative    Respiratory:  Negative    Cardiovascular:  Negative    Gastroenterology: Negative    Genitourinary:  Positive for nocturia;prostate problem;urinary frequency  Musculoskeletal:  Negative    Neurologic:  Negative    Psychiatric:  Positive for sleep disturbances  Heme/Lymph/Imm:  Positive for allergies  Endocrine:  Negative      PAST MEDICAL HISTORY:  Past Medical History:    Diagnosis Date     Angina pectoris (H)      CAD (coronary artery disease)     nonSTEMI, 2007 cath - JASON LAD     HTN (hypertension)      Hyperlipidemia      MI, old     nonSTEMI       PAST SURGICAL HISTORY:  No past surgical history on file.    SOCIAL HISTORY:  Social History     Social History     Marital status:      Spouse name: N/A     Number of children: N/A     Years of education: N/A     Social History Main Topics     Smoking status: Never Smoker     Smokeless tobacco: Never Used     Alcohol use 0.0 oz/week     0 Standard drinks or equivalent per week      Comment: 2 beers a week     Drug use: No     Sexual activity: Not Asked     Other Topics Concern     Caffeine Concern Yes     3 cups of coffee a day.  8 oz diet coke     Sleep Concern No     nocturia     Stress Concern No     Weight Concern No     Special Diet Yes     low fat. no fried foods. whole wheat     Exercise Yes     30 minutes a day walking, cycling     Seat Belt Yes     Social History Narrative       FAMILY HISTORY:  Family History   Problem Relation Age of Onset     HEART DISEASE Mother       from heart attack     Alzheimer Disease Mother      Diabetes Father      HEART DISEASE Father      HEART DISEASE Paternal Grandmother      HEART DISEASE Paternal Grandfather      Breast Cancer Sister 50     Other - See Comments Brother      AVM - atririal venous malfunction       MEDS:   Current Outpatient Prescriptions on File Prior to Visit:  Acetaminophen (TYLENOL 8 HOUR PO) Take by mouth as needed    aspirin 81 MG tablet Take 81 mg by mouth daily   atorvastatin (LIPITOR) 80 MG tablet Take 1 tablet (80 mg) by mouth daily   carvedilol (COREG) 6.25 MG tablet Take 2 tablets (12.5 mg) by mouth 2 times daily (with meals)   diphenhydrAMINE-APAP, sleep, (TYLENOL PM EXTRA STRENGTH)  MG/30ML LIQD Take by mouth as needed    hydrochlorothiazide 12.5 MG TABS tablet Take 2 tablets (25 mg) by mouth daily   Ibuprofen-Diphenhydramine Cit  "(ADVIL PM) 200-38 MG TABS Take by mouth every evening as needed   irbesartan (AVAPRO) 300 MG tablet Take 1 tablet (300 mg) by mouth At Bedtime   Multiple vitamin TABS Take by mouth daily    nitroGLYcerin (NITROSTAT) 0.4 MG sublingual tablet Place 1 tablet (0.4 mg) under the tongue every 5 minutes as needed for chest pain   tamsulosin (FLOMAX) 0.4 MG capsule Take by mouth daily     No current facility-administered medications on file prior to visit.     ALLERGIES:   Allergies   Allergen Reactions     Penicillins Rash       PHYSICAL EXAM:  Vitals: /72 (BP Location: Right arm, Patient Position: Sitting, Cuff Size: Adult Regular)  Pulse 66  Ht 1.803 m (5' 11\")  Wt 80.4 kg (177 lb 3.2 oz)  SpO2 97%  BMI 24.71 kg/m2       Constitutional:  cooperative, alert and oriented, well developed, well nourished, in no acute distress        Skin:  warm and dry to the touch, no apparent skin lesions or masses noted        Head:  normocephalic, no masses or lesions        Eyes:  pupils equal and round;conjunctivae and lids unremarkable;sclera white;no xanthalasma        ENT:  no pallor or cyanosis, dentition good        Neck:  JVP normal        Respiratory:  normal breath sounds, clear to auscultation, normal A-P diameter, normal symmetry, normal respiratory excursion, no use of accessory muscles        Cardiac: regular rhythm;normal S1 and S2       systolic ejection murmur          GI:  abdomen soft;BS normoactive;non-tender        Vascular: pulses full and equal                                      Extremities and Musculoskeletal:  no deformities, clubbing, cyanosis, erythema observed;no edema        Neurological:  no gross motor deficits;affect appropriate            ASSESSMENT/PLAN:  1.  Coronary artery disease:   A.  Non-ST segment elevation MI in 2007.   B.  History of implantation of 3.5 x 38 mm length Sirolimus-Eluting Cypher stent in the LAD.  No significant narrowing in dominant right coronary, left main or " circumflex.  Negative nuclear stress test 2011.   C.  No anginal sxs.   D.  Continue ASA, high intensity statin.     2.  Hypertension.   A.  BPs have been consistently < 140/90 here in the clinic, but at home, they are averaging 140/78 and are not infrequently into the 150 range.   B.  24 Hr Ambulatory monitor showed average 116/70, indicating well controlled BP  C.  Continue carvedilol to 12.5 mg BID, irbesartan 300 mg daily, hydrochlorothiazide 25 mg daily.  I should note he is also on tamsulosin 0.4 mg for BPH.     3.  Dyslipidemia.   A.  Continue atorvastatin 80 mg. Last LDL 12/26/17 was 84.         Follow up:  Dr. Baldwin in May 2019     aCrlos Mosquera PA-C    Thank you for allowing me to participate in the care of your patient.      Sincerely,     Carlos Mosquera PA-C     [facility]    cc:   Carlos Mosquera PA-C  [address] [place] [address]         Months Of Therapy Completed: 1

## 2022-06-15 DIAGNOSIS — I25.10 CORONARY ARTERY DISEASE INVOLVING NATIVE CORONARY ARTERY OF NATIVE HEART WITHOUT ANGINA PECTORIS: ICD-10-CM

## 2022-06-15 RX ORDER — IRBESARTAN 150 MG/1
300 TABLET ORAL AT BEDTIME
Qty: 180 TABLET | Refills: 0 | Status: SHIPPED | OUTPATIENT
Start: 2022-06-15 | End: 2022-07-19

## 2022-06-15 NOTE — TELEPHONE ENCOUNTER
[facility] Cardiology Refill Guideline reviewed.  Medication meets criteria for refill.  Future OV 7/19/22.  Nuria Lance RN on 6/15/2022 at 4:00 PM

## 2022-06-24 DIAGNOSIS — E78.2 MIXED HYPERLIPIDEMIA: ICD-10-CM

## 2022-06-24 DIAGNOSIS — I10 BENIGN ESSENTIAL HYPERTENSION: ICD-10-CM

## 2022-06-24 DIAGNOSIS — I25.10 CORONARY ARTERY DISEASE INVOLVING NATIVE CORONARY ARTERY OF NATIVE HEART WITHOUT ANGINA PECTORIS: ICD-10-CM

## 2022-06-24 RX ORDER — CARVEDILOL 12.5 MG/1
12.5 TABLET ORAL 2 TIMES DAILY WITH MEALS
Qty: 180 TABLET | Refills: 0 | Status: SHIPPED | OUTPATIENT
Start: 2022-06-24 | End: 2022-07-19

## 2022-07-19 ENCOUNTER — OFFICE VISIT (OUTPATIENT)
Dept: CARDIOLOGY | Facility: CLINIC | Age: 72
End: 2022-07-19
Payer: MEDICARE

## 2022-07-19 VITALS
HEIGHT: 71 IN | SYSTOLIC BLOOD PRESSURE: 130 MMHG | BODY MASS INDEX: 24.81 KG/M2 | WEIGHT: 177.2 LBS | DIASTOLIC BLOOD PRESSURE: 72 MMHG | HEART RATE: 60 BPM

## 2022-07-19 DIAGNOSIS — E78.2 MIXED HYPERLIPIDEMIA: ICD-10-CM

## 2022-07-19 DIAGNOSIS — I25.10 CORONARY ARTERY DISEASE INVOLVING NATIVE CORONARY ARTERY OF NATIVE HEART WITHOUT ANGINA PECTORIS: ICD-10-CM

## 2022-07-19 DIAGNOSIS — I10 BENIGN ESSENTIAL HYPERTENSION: Primary | ICD-10-CM

## 2022-07-19 PROCEDURE — 99214 OFFICE O/P EST MOD 30 MIN: CPT | Performed by: INTERNAL MEDICINE

## 2022-07-19 RX ORDER — IRBESARTAN 150 MG/1
300 TABLET ORAL AT BEDTIME
Qty: 180 TABLET | Refills: 0 | Status: SHIPPED | OUTPATIENT
Start: 2022-07-19 | End: 2023-06-06

## 2022-07-19 RX ORDER — CARVEDILOL 12.5 MG/1
12.5 TABLET ORAL 2 TIMES DAILY WITH MEALS
Qty: 180 TABLET | Refills: 0 | Status: SHIPPED | OUTPATIENT
Start: 2022-07-19 | End: 2022-12-21

## 2022-07-19 RX ORDER — ATORVASTATIN CALCIUM 80 MG/1
80 TABLET, FILM COATED ORAL DAILY
Qty: 90 TABLET | Refills: 3 | Status: SHIPPED | OUTPATIENT
Start: 2022-07-19 | End: 2023-07-28

## 2022-07-19 RX ORDER — AMLODIPINE BESYLATE 5 MG/1
5 TABLET ORAL DAILY
Qty: 90 TABLET | Refills: 11 | Status: SHIPPED | OUTPATIENT
Start: 2022-07-19 | End: 2023-08-02

## 2022-07-19 RX ORDER — HYDROCHLOROTHIAZIDE 25 MG/1
25 TABLET ORAL DAILY
Qty: 90 TABLET | Refills: 3 | Status: SHIPPED | OUTPATIENT
Start: 2022-07-19 | End: 2023-08-25

## 2022-07-19 RX ORDER — NITROGLYCERIN 0.4 MG/1
0.4 TABLET SUBLINGUAL EVERY 5 MIN PRN
Qty: 25 TABLET | Refills: 3 | Status: SHIPPED | OUTPATIENT
Start: 2022-07-19 | End: 2023-08-10

## 2022-07-19 NOTE — PROGRESS NOTES
Service Date: 07/19/2022    CARDIOLOGY CLINIC VISIT NOTE    REFERRING PHYSICIAN:  Regan Decker MD     HISTORY OF PRESENT ILLNESS:  Gonzalo Tucker, a 71-year-old man with coronary artery disease, dyslipidemia, hypertension and idiopathic urticaria, was seen today at your request for followup.    Since last seen, Mr. Tucker remains asymptomatic and specifically denies chest, arm, neck, jaw or back discomfort with activity.  He exercises regularly and follows a Mediterranean diet.  The patient was trialed on cyclosporine last fall for management of urticaria, but developed significant hypertension, which required his primary care doctor to withdraw the medication.  His blood pressure is now well controlled on the current regimen.  He has been switched to omalizumab (Xolair), which has improved his symptoms significantly, although he still has some episodes of urticaria.    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  He just had his first grandchild.    PAST MEDICAL HISTORY:    1.  Idiopathic urticaria: Followed by a dermatologist.  Currently on omalizumab.  2.  Hypertension.  3.  Dyslipidemia.  4.  Coronary artery disease.  a.  Non-ST segment elevation MI in 2007.  b.  History of stent implantation in proximal LAD.  Right coronary, circumflex and left main with no significant narrowing.  c.  Negative nuclear stress test in 2019.    PHYSICAL EXAMINATION:    GENERAL:  Exam today demonstrates a very pleasant, cooperative and intelligent, 71-year-old man.  VITAL SIGNS:  His blood pressure is 130/72. His heart rate is 60.  His height is 80.4 kg.  His BMI is 24.7.  LUNGS:  Clear to percussion and auscultation.  CARDIOVASCULAR:  Normal S1 with a normal S2.  There is no S3.  There is no murmur, rub or click.  His pulses are full and symmetrical.    MEDICATIONS:    1.  Amlodipine 5 mg daily.  2.  Aspirin 81 daily.  3.  Atorvastatin 80 daily.  4.  Carvedilol 12.5 mg  b.i.d.  5.  Cetirizine.  6.  Hydrochlorothiazide 25 daily.  7.  Irbesartan 150 daily.  8.  Nitroglycerin sublingual p.r.n.   9.  Omalizumab injections subcutaneous every 28 days.    LABORATORY STUDIES:  A recent LDL cholesterol in your office was 75.    ASSESSMENT:  Mr. Courtney is asymptomatic on guideline-directed medical therapy with optimal blood pressure and LDL cholesterol levels.  He exercises on a regular basis and remains free of cardiovascular symptoms.    RECOMMENDATIONS:    1.  Continue present medical therapy:  Prescriptions refilled.  2.  Regular exercise program.  3.  Mediterranean-style diet.  4.  Follow up with me in about 1 year, earlier if any new symptoms.    We greatly appreciate the opportunity to participate in the care of your patient Mr. Gonzalo Courtney.    cc:  [place] [address]    Kuldeep Baldwin MD        D: 2022   T: 2022   MT: connie    Name:     GONZALO COURTNEY  MRN:      [id number]        Account:      [id number]   :      1950           Service Date: 2022       Document: [id number]

## 2022-07-19 NOTE — PROGRESS NOTES
"HISTORY OF PRESENT ILLNESS:  Urticaria is better on omalizumab.no chest pain bp ok.     Orders this Visit:  No orders of the defined types were placed in this encounter.    Orders Placed This Encounter   Medications     omalizumab (XOLAIR) 150 MG injection     Sig: Inject Subcutaneous every 28 days     There are no discontinued medications.    No diagnosis found.    CURRENT MEDICATIONS:  Current Outpatient Medications   Medication Sig Dispense Refill     amLODIPine (NORVASC) 5 MG tablet Take 5 mg by mouth daily       aspirin 81 MG tablet Take 81 mg by mouth every evening        atorvastatin (LIPITOR) 80 MG tablet Take 1 tablet (80 mg) by mouth daily 90 tablet 3     carvedilol (COREG) 12.5 MG tablet Take 1 tablet (12.5 mg) by mouth 2 times daily (with meals) 180 tablet 0     cetirizine (ZYRTEC) 10 MG tablet Take 10 mg by mouth 2 times daily        hydrochlorothiazide (HYDRODIURIL) 25 MG tablet Take 1 tablet (25 mg) by mouth daily 90 tablet 3     irbesartan (AVAPRO) 150 MG tablet Take 2 tablets (300 mg) by mouth At Bedtime 180 tablet 0     multivitamin w/minerals (THERA-VIT-M) tablet Take 1 tablet by mouth daily       nitroGLYcerin (NITROSTAT) 0.4 MG sublingual tablet Place 1 tablet (0.4 mg) under the tongue every 5 minutes as needed for chest pain 25 tablet 3     omalizumab (XOLAIR) 150 MG injection Inject Subcutaneous every 28 days       cycloSPORINE modified (GENERIC EQUIVALENT) 100 MG capsule Take 100 mg by mouth 2 times daily (Patient not taking: Reported on 7/19/2022)         ALLERGIES     Allergies   Allergen Reactions     Pcn [Penicillins] Rash       PAST MEDICAL, SURGICAL, FAMILY, SOCIAL HISTORY:  History was reviewed and updated as needed, see medical record.    Review of Systems:  A 12-point review of systems was completed, see medical record for detailed review of systems information.    Physical Exam:  Vitals: /72   Pulse 60   Ht 1.803 m (5' 11\")   Wt 80.4 kg (177 lb 3.2 oz)   BMI 24.71 kg/m  "     Constitutional:           Skin:           Head:           Eyes:           ENT:           Neck:           Chest:           Cardiac:                    Abdomen:           Vascular:                                        Extremities and Back:           Neurological:           ASSESSMENT: stable        RECOMMENDATIONS:  Continue meds diet exercise      Recent Lab Results:  LIPID RESULTS:  Lab Results   Component Value Date    CHOL 153 12/26/2017    HDL 37 (L) 12/26/2017    LDL 84 12/26/2017    TRIG 161 (H) 12/26/2017    CHOLHDLRATIO 3.1 11/11/2015       LIVER ENZYME RESULTS:  Lab Results   Component Value Date    AST 36 12/26/2019    ALT 50 12/26/2019       CBC RESULTS:  Lab Results   Component Value Date    WBC 6.3 12/26/2019    RBC 5.02 12/26/2019    HGB 14.6 12/26/2019    HCT 43.6 12/26/2019    MCV 87 12/26/2019    MCH 29.1 12/26/2019    MCHC 33.5 12/26/2019    RDW 14.1 12/26/2019     12/26/2019       BMP RESULTS:  Lab Results   Component Value Date     12/26/2019    POTASSIUM 4.2 12/26/2019    CHLORIDE 106 12/26/2019    CO2 31 12/26/2019    ANIONGAP 3 12/26/2019    GLC 89 12/26/2019    BUN 41 (H) 12/26/2019    CR 1.11 12/26/2019    GFRESTIMATED 68 12/26/2019    GFRESTBLACK 79 12/26/2019    ESPERANZA 9.3 12/26/2019        A1C RESULTS:  No results found for: A1C    INR RESULTS:  Lab Results   Component Value Date    INR 1.04 02/10/2008    INR 0.97 08/25/2007       We greatly appreciate the opportunity to be involved in the care of your patient, Gonzalo Tucker.    Sincerely,  Kuldeep Baldwin MD      CC  No referring provider defined for this encounter.

## 2022-07-19 NOTE — LETTER
7/19/2022    Regan Archuleta MD  [facility] [address] [facility] [address]    RE: Gonzalo Tucker       Dear Colleague,     I had the pleasure of seeing Gonzalo Tucker in the [facility].  HISTORY OF PRESENT ILLNESS:  Urticaria is better on omalizumab.no chest pain bp ok.     Orders this Visit:  No orders of the defined types were placed in this encounter.    Orders Placed This Encounter   Medications     omalizumab (XOLAIR) 150 MG injection     Sig: Inject Subcutaneous every 28 days     There are no discontinued medications.    No diagnosis found.    CURRENT MEDICATIONS:  Current Outpatient Medications   Medication Sig Dispense Refill     amLODIPine (NORVASC) 5 MG tablet Take 5 mg by mouth daily       aspirin 81 MG tablet Take 81 mg by mouth every evening        atorvastatin (LIPITOR) 80 MG tablet Take 1 tablet (80 mg) by mouth daily 90 tablet 3     carvedilol (COREG) 12.5 MG tablet Take 1 tablet (12.5 mg) by mouth 2 times daily (with meals) 180 tablet 0     cetirizine (ZYRTEC) 10 MG tablet Take 10 mg by mouth 2 times daily        hydrochlorothiazide (HYDRODIURIL) 25 MG tablet Take 1 tablet (25 mg) by mouth daily 90 tablet 3     irbesartan (AVAPRO) 150 MG tablet Take 2 tablets (300 mg) by mouth At Bedtime 180 tablet 0     multivitamin w/minerals (THERA-VIT-M) tablet Take 1 tablet by mouth daily       nitroGLYcerin (NITROSTAT) 0.4 MG sublingual tablet Place 1 tablet (0.4 mg) under the tongue every 5 minutes as needed for chest pain 25 tablet 3     omalizumab (XOLAIR) 150 MG injection Inject Subcutaneous every 28 days       cycloSPORINE modified (GENERIC EQUIVALENT) 100 MG capsule Take 100 mg by mouth 2 times daily (Patient not taking: Reported on 7/19/2022)         ALLERGIES     Allergies   Allergen Reactions     Pcn [Penicillins] Rash       PAST MEDICAL, SURGICAL, FAMILY, SOCIAL HISTORY:  History was reviewed and updated as needed, see medical record.    Review of  "Systems:  A 12-point review of systems was completed, see medical record for detailed review of systems information.    Physical Exam:  Vitals: /72   Pulse 60   Ht 1.803 m (5' 11\")   Wt 80.4 kg (177 lb 3.2 oz)   BMI 24.71 kg/m      Constitutional:           Skin:           Head:           Eyes:           ENT:           Neck:           Chest:           Cardiac:                    Abdomen:           Vascular:                                        Extremities and Back:           Neurological:           ASSESSMENT: stable        RECOMMENDATIONS:  Continue meds diet exercise      Recent Lab Results:  LIPID RESULTS:  Lab Results   Component Value Date    CHOL 153 12/26/2017    HDL 37 (L) 12/26/2017    LDL 84 12/26/2017    TRIG 161 (H) 12/26/2017    CHOLHDLRATIO 3.1 11/11/2015       LIVER ENZYME RESULTS:  Lab Results   Component Value Date    AST 36 12/26/2019    ALT 50 12/26/2019       CBC RESULTS:  Lab Results   Component Value Date    WBC 6.3 12/26/2019    RBC 5.02 12/26/2019    HGB 14.6 12/26/2019    HCT 43.6 12/26/2019    MCV 87 12/26/2019    MCH 29.1 12/26/2019    MCHC 33.5 12/26/2019    RDW 14.1 12/26/2019     12/26/2019       BMP RESULTS:  Lab Results   Component Value Date     12/26/2019    POTASSIUM 4.2 12/26/2019    CHLORIDE 106 12/26/2019    CO2 31 12/26/2019    ANIONGAP 3 12/26/2019    GLC 89 12/26/2019    BUN 41 (H) 12/26/2019    CR 1.11 12/26/2019    GFRESTIMATED 68 12/26/2019    GFRESTBLACK 79 12/26/2019    ESPERANZA 9.3 12/26/2019        A1C RESULTS:  No results found for: A1C    INR RESULTS:  Lab Results   Component Value Date    INR 1.04 02/10/2008    INR 0.97 08/25/2007       We greatly appreciate the opportunity to be involved in the care of your patient, Gonzalo Tucker.    Sincerely,  Kuldeep Baldwin MD      CC  No referring provider defined for this encounter.                                                                       Service Date: 07/19/2022    CARDIOLOGY CLINIC " VISIT NOTE    REFERRING PHYSICIAN:  Regan Decker MD     HISTORY OF PRESENT ILLNESS:  Gonzalo Tucker, a 71-year-old man with coronary artery disease, dyslipidemia, hypertension and idiopathic urticaria, was seen today at your request for followup.    Since last seen, Mr. Tucker remains asymptomatic and specifically denies chest, arm, neck, jaw or back discomfort with activity.  He exercises regularly and follows a Mediterranean diet.  The patient was trialed on cyclosporine last fall for management of urticaria, but developed significant hypertension, which required his primary care doctor to withdraw the medication.  His blood pressure is now well controlled on the current regimen.  He has been switched to omalizumab (Xolair), which has improved his symptoms significantly, although he still has some episodes of urticaria.    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  He just had his first grandchild.    PAST MEDICAL HISTORY:    1.  Idiopathic urticaria: Followed by a dermatologist.  Currently on omalizumab.  2.  Hypertension.  3.  Dyslipidemia.  4.  Coronary artery disease.  a.  Non-ST segment elevation MI in 2007.  b.  History of stent implantation in proximal LAD.  Right coronary, circumflex and left main with no significant narrowing.  c.  Negative nuclear stress test in 2019.    PHYSICAL EXAMINATION:    GENERAL:  Exam today demonstrates a very pleasant, cooperative and intelligent, 71-year-old man.  VITAL SIGNS:  His blood pressure is 130/72. His heart rate is 60.  His height is 80.4 kg.  His BMI is 24.7.  LUNGS:  Clear to percussion and auscultation.  CARDIOVASCULAR:  Normal S1 with a normal S2.  There is no S3.  There is no murmur, rub or click.  His pulses are full and symmetrical.    MEDICATIONS:    1.  Amlodipine 5 mg daily.  2.  Aspirin 81 daily.  3.  Atorvastatin 80 daily.  4.  Carvedilol 12.5 mg b.i.d.  5.  Cetirizine.  6.  Hydrochlorothiazide 25  daily.  7.  Irbesartan 150 daily.  8.  Nitroglycerin sublingual p.r.n.   9.  Omalizumab injections subcutaneous every 28 days.    LABORATORY STUDIES:  A recent LDL cholesterol in your office was 75.    ASSESSMENT:  Mr. Courtney is asymptomatic on guideline-directed medical therapy with optimal blood pressure and LDL cholesterol levels.  He exercises on a regular basis and remains free of cardiovascular symptoms.    RECOMMENDATIONS:    1.  Continue present medical therapy:  Prescriptions refilled.  2.  Regular exercise program.  3.  Mediterranean-style diet.  4.  Follow up with me in about 1 year, earlier if any new symptoms.    We greatly appreciate the opportunity to participate in the care of your patient Mr. Gonzalo Courtney.    cc:  [place] [address]    Kuldeep Baldwin MD        D: 2022   T: 2022   MT: connie    Name:     GONZALO COURTNEY  MRN:      [id number]        Account:      [id number]   :      1950           Service Date: 2022       Document: [id number]      Thank you for allowing me to participate in the care of your patient.      Sincerely,     Kuldeep Baldwin MD     [facility]  cc:   No referring provider defined for this encounter.

## 2022-12-21 DIAGNOSIS — I25.10 CORONARY ARTERY DISEASE INVOLVING NATIVE CORONARY ARTERY OF NATIVE HEART WITHOUT ANGINA PECTORIS: ICD-10-CM

## 2022-12-21 DIAGNOSIS — I10 BENIGN ESSENTIAL HYPERTENSION: ICD-10-CM

## 2022-12-21 DIAGNOSIS — E78.2 MIXED HYPERLIPIDEMIA: ICD-10-CM

## 2022-12-21 RX ORDER — CARVEDILOL 12.5 MG/1
12.5 TABLET ORAL 2 TIMES DAILY WITH MEALS
Qty: 180 TABLET | Refills: 1 | Status: SHIPPED | OUTPATIENT
Start: 2022-12-21 | End: 2023-03-30

## 2023-03-30 DIAGNOSIS — I10 BENIGN ESSENTIAL HYPERTENSION: ICD-10-CM

## 2023-03-30 DIAGNOSIS — I25.10 CORONARY ARTERY DISEASE INVOLVING NATIVE CORONARY ARTERY OF NATIVE HEART WITHOUT ANGINA PECTORIS: ICD-10-CM

## 2023-03-30 DIAGNOSIS — E78.2 MIXED HYPERLIPIDEMIA: ICD-10-CM

## 2023-03-30 RX ORDER — CARVEDILOL 12.5 MG/1
12.5 TABLET ORAL 2 TIMES DAILY WITH MEALS
Qty: 180 TABLET | Refills: 1 | Status: SHIPPED | OUTPATIENT
Start: 2023-03-30 | End: 2023-11-09

## 2023-03-30 NOTE — TELEPHONE ENCOUNTER
Patient states the refill had Dr. Hall not Dr. Baldwin name on Coreg.  Patient is out of Coreg as of today and requested a refill and to change to Dr. Baldwin last OV 7-19-22.  Patient verbalized understanding.

## 2023-03-31 NOTE — TELEPHONE ENCOUNTER
Pt left VM asking for a script sent in for Coreg. Script was sent in 3/30/23. Called pt, no answer. Left a detailed message stating script was already sent in. If pt needs script sent in to different pharmacy, to callback. [place]

## 2023-06-06 DIAGNOSIS — I25.10 CORONARY ARTERY DISEASE INVOLVING NATIVE CORONARY ARTERY OF NATIVE HEART WITHOUT ANGINA PECTORIS: ICD-10-CM

## 2023-06-06 RX ORDER — IRBESARTAN 150 MG/1
300 TABLET ORAL AT BEDTIME
Qty: 180 TABLET | Refills: 0 | Status: SHIPPED | OUTPATIENT
Start: 2023-06-06 | End: 2023-09-06

## 2023-07-28 DIAGNOSIS — I25.10 CORONARY ARTERY DISEASE INVOLVING NATIVE CORONARY ARTERY OF NATIVE HEART WITHOUT ANGINA PECTORIS: ICD-10-CM

## 2023-07-28 DIAGNOSIS — E78.2 MIXED HYPERLIPIDEMIA: ICD-10-CM

## 2023-07-28 RX ORDER — ATORVASTATIN CALCIUM 80 MG/1
80 TABLET, FILM COATED ORAL DAILY
Qty: 90 TABLET | Refills: 0 | Status: SHIPPED | OUTPATIENT
Start: 2023-07-28 | End: 2023-10-31

## 2023-08-02 DIAGNOSIS — I25.10 CORONARY ARTERY DISEASE INVOLVING NATIVE CORONARY ARTERY OF NATIVE HEART WITHOUT ANGINA PECTORIS: ICD-10-CM

## 2023-08-02 DIAGNOSIS — E78.2 MIXED HYPERLIPIDEMIA: ICD-10-CM

## 2023-08-02 DIAGNOSIS — I10 BENIGN ESSENTIAL HYPERTENSION: ICD-10-CM

## 2023-08-02 RX ORDER — AMLODIPINE BESYLATE 5 MG/1
5 TABLET ORAL DAILY
Qty: 90 TABLET | Refills: 0 | Status: SHIPPED | OUTPATIENT
Start: 2023-08-02 | End: 2023-08-10

## 2023-08-10 ENCOUNTER — OFFICE VISIT (OUTPATIENT)
Dept: CARDIOLOGY | Facility: CLINIC | Age: 73
End: 2023-08-10
Payer: MEDICARE

## 2023-08-10 VITALS
WEIGHT: 181.3 LBS | BODY MASS INDEX: 24.56 KG/M2 | DIASTOLIC BLOOD PRESSURE: 84 MMHG | SYSTOLIC BLOOD PRESSURE: 136 MMHG | HEART RATE: 54 BPM | HEIGHT: 72 IN | OXYGEN SATURATION: 98 %

## 2023-08-10 DIAGNOSIS — I25.10 CORONARY ARTERY DISEASE INVOLVING NATIVE CORONARY ARTERY OF NATIVE HEART WITHOUT ANGINA PECTORIS: ICD-10-CM

## 2023-08-10 DIAGNOSIS — I10 BENIGN ESSENTIAL HYPERTENSION: ICD-10-CM

## 2023-08-10 DIAGNOSIS — E78.2 MIXED HYPERLIPIDEMIA: Primary | ICD-10-CM

## 2023-08-10 PROCEDURE — 99214 OFFICE O/P EST MOD 30 MIN: CPT | Performed by: INTERNAL MEDICINE

## 2023-08-10 RX ORDER — OMALIZUMAB 150 MG/ML
INJECTION, SOLUTION SUBCUTANEOUS
COMMUNITY
Start: 2023-08-03

## 2023-08-10 RX ORDER — AMLODIPINE BESYLATE 5 MG/1
10 TABLET ORAL DAILY
Qty: 90 TABLET | Refills: 0 | Status: SHIPPED | OUTPATIENT
Start: 2023-08-10 | End: 2023-11-06

## 2023-08-10 NOTE — PROGRESS NOTES
HISTORY OF PRESENT ILLNESS  Gonzalo Tucker, a 73-year-old man with coronary artery disease, dyslipidemia, hypertension, and idiopathic urticaria was seen today at your request for followup.     Since I last saw the patient in July 2022, he has remained free of chest arm neck jaw or back discomfort with exertion.  He is very concerned about his blood pressure, which has been more difficult to control despite irbesartan 300 mg daily, hydrochlorothiazide 25 daily, carvedilol 12.5 twice daily, and amlodipine 5 mg daily.  The patient brought his home monitoring device to the clinic today.  When he checked his blood pressure during our office visit, it was as high as 167/86, however when I checked his blood pressure with a cuff it was 142/84.  The patient tells me that his home blood pressures have been more in the range of 142/84 using his current blood pressure device.    The patient's been switched from cyclosporine to Xolair which has controlled his idiopathic urticaria better than the previous agent.    PAST MEDICAL HISTORY:    1.  Idiopathic urticaria -- followed by Dermatology.  on Xolair  2.  Hypertension.  3.  Dyslipidemia.  4.  Coronary artery disease.    a.  Non-ST segment elevation MI 2007.  b.  History of stent implantation in proximal LAD.  Right coronary, circumflex left main with no significant narrowing.  c.  Negative nuclear stress test 2019  Orders this Visit:  No orders of the defined types were placed in this encounter.    Orders Placed This Encounter   Medications    XOLAIR 150 MG/ML SOSY injection     There are no discontinued medications.    No diagnosis found.    CURRENT MEDICATIONS:  Current Outpatient Medications   Medication Sig Dispense Refill    amLODIPine (NORVASC) 5 MG tablet Take 1 tablet (5 mg) by mouth daily 90 tablet 0    aspirin 81 MG tablet Take 81 mg by mouth every evening       atorvastatin (LIPITOR) 80 MG tablet Take 1 tablet (80 mg) by mouth daily 90 tablet 0    carvedilol  "(COREG) 12.5 MG tablet Take 1 tablet (12.5 mg) by mouth 2 times daily (with meals) 180 tablet 1    hydrochlorothiazide (HYDRODIURIL) 25 MG tablet Take 1 tablet (25 mg) by mouth daily 90 tablet 3    irbesartan (AVAPRO) 150 MG tablet Take 2 tablets (300 mg) by mouth At Bedtime 180 tablet 0    multivitamin w/minerals (THERA-VIT-M) tablet Take 1 tablet by mouth daily      omalizumab (XOLAIR) 150 MG injection Inject Subcutaneous every 28 days      XOLAIR 150 MG/ML SOSY injection       cetirizine (ZYRTEC) 10 MG tablet Take 10 mg by mouth 2 times daily  (Patient not taking: Reported on 8/10/2023)      cycloSPORINE modified (GENERIC EQUIVALENT) 100 MG capsule Take 100 mg by mouth 2 times daily (Patient not taking: Reported on 7/19/2022)      nitroGLYcerin (NITROSTAT) 0.4 MG sublingual tablet Place 1 tablet (0.4 mg) under the tongue every 5 minutes as needed for chest pain (Patient not taking: Reported on 8/10/2023) 25 tablet 3       ALLERGIES     Allergies   Allergen Reactions    Pcn [Penicillins] Rash       PAST MEDICAL, SURGICAL, FAMILY, SOCIAL HISTORY:  History was reviewed and updated as needed, see medical record.    Review of Systems:  A 12-point review of systems was completed, see medical record for detailed review of systems information.    Physical Exam:  Vitals: /84 (BP Location: Right arm, Patient Position: Sitting, Cuff Size: Adult Regular)   Pulse 54   Ht 1.816 m (5' 11.5\")   Wt 82.2 kg (181 lb 4.8 oz)   SpO2 98%   BMI 24.93 kg/m      Pleasant, cooperative, intelligent.  Lungs clear to percussion auscultation    Cardiac normal S1 normal S2 no S3 no murmur or click    ASSESSMENT:     The patient's blood pressure has been resistant to appears to be improved after the addition of amlodipine.  I am uncertain as to the cause of the discrepancy between the blood pressures measured in the office today with his device and those that I obtained with the cuff.  I would suggest increasing amlodipine from 5 to " 10 mg daily and monitoring his blood pressure at home with a follow-up visit with one of our YONI's.       RECOMMENDATIONS:   1) Increase amlodipine to 10 mg daily. Continue rest  of medications/monitor blood pressure at home  2) Mediterranean style weight loss diet  3) Regular exercise program  4) Follow up with YONI in 1 month   5) Consider adding spironolactone if bp suboptimally controlled.       Recent Lab Results:    A lipid profile with his primary care doctor drawn earlier this year showed an LDL cholesterol of 72.  His creatinine potassium were normal.  LIPID RESULTS:  Lab Results   Component Value Date    CHOL 153 12/26/2017    HDL 37 (L) 12/26/2017    LDL 84 12/26/2017    TRIG 161 (H) 12/26/2017    CHOLHDLRATIO 3.1 11/11/2015       LIVER ENZYME RESULTS:  Lab Results   Component Value Date    AST 36 12/26/2019    ALT 50 12/26/2019       CBC RESULTS:  Lab Results   Component Value Date    WBC 6.3 12/26/2019    RBC 5.02 12/26/2019    HGB 14.6 12/26/2019    HCT 43.6 12/26/2019    MCV 87 12/26/2019    MCH 29.1 12/26/2019    MCHC 33.5 12/26/2019    RDW 14.1 12/26/2019     12/26/2019       BMP RESULTS:  Lab Results   Component Value Date     12/26/2019    POTASSIUM 4.2 12/26/2019    CHLORIDE 106 12/26/2019    CO2 31 12/26/2019    ANIONGAP 3 12/26/2019    GLC 89 12/26/2019    BUN 41 (H) 12/26/2019    CR 1.11 12/26/2019    GFRESTIMATED 68 12/26/2019    GFRESTBLACK 79 12/26/2019    ESPERANZA 9.3 12/26/2019        A1C RESULTS:  No results found for: A1C    INR RESULTS:  Lab Results   Component Value Date    INR 1.04 02/10/2008    INR 0.97 08/25/2007       We greatly appreciate the opportunity to be involved in the care of your patient, Gonzalo Tucker.    Sincerely,  Kuldeep Baldwin MD      CC  Kuldeep Baldwin MD  [address]  [place]  [place] [address]

## 2023-08-10 NOTE — LETTER
8/10/2023    Regan Archuleta MD  [facility] [address] [facility] [address]    RE: Gonzalo Tucker       Dear Colleague,     I had the pleasure of seeing Gonzalo Tucker in the [facility].  HISTORY OF PRESENT ILLNESS:  BP control  LDL 72  BPs running 131/77 Cr 0.89  142/84 in office  [place]  in  Fall     Orders this Visit:  No orders of the defined types were placed in this encounter.    Orders Placed This Encounter   Medications    XOLAIR 150 MG/ML SOSY injection     There are no discontinued medications.    No diagnosis found.    CURRENT MEDICATIONS:  Current Outpatient Medications   Medication Sig Dispense Refill    amLODIPine (NORVASC) 5 MG tablet Take 1 tablet (5 mg) by mouth daily 90 tablet 0    aspirin 81 MG tablet Take 81 mg by mouth every evening       atorvastatin (LIPITOR) 80 MG tablet Take 1 tablet (80 mg) by mouth daily 90 tablet 0    carvedilol (COREG) 12.5 MG tablet Take 1 tablet (12.5 mg) by mouth 2 times daily (with meals) 180 tablet 1    hydrochlorothiazide (HYDRODIURIL) 25 MG tablet Take 1 tablet (25 mg) by mouth daily 90 tablet 3    irbesartan (AVAPRO) 150 MG tablet Take 2 tablets (300 mg) by mouth At Bedtime 180 tablet 0    multivitamin w/minerals (THERA-VIT-M) tablet Take 1 tablet by mouth daily      omalizumab (XOLAIR) 150 MG injection Inject Subcutaneous every 28 days      XOLAIR 150 MG/ML SOSY injection       cetirizine (ZYRTEC) 10 MG tablet Take 10 mg by mouth 2 times daily  (Patient not taking: Reported on 8/10/2023)      cycloSPORINE modified (GENERIC EQUIVALENT) 100 MG capsule Take 100 mg by mouth 2 times daily (Patient not taking: Reported on 7/19/2022)      nitroGLYcerin (NITROSTAT) 0.4 MG sublingual tablet Place 1 tablet (0.4 mg) under the tongue every 5 minutes as needed for chest pain (Patient not taking: Reported on 8/10/2023) 25 tablet 3       ALLERGIES     Allergies   Allergen Reactions    Pcn [Penicillins] Rash       PAST MEDICAL,  "SURGICAL, FAMILY, SOCIAL HISTORY:  History was reviewed and updated as needed, see medical record.    Review of Systems:  A 12-point review of systems was completed, see medical record for detailed review of systems information.    Physical Exam:  Vitals: /84 (BP Location: Right arm, Patient Position: Sitting, Cuff Size: Adult Regular)   Pulse 54   Ht 1.816 m (5' 11.5\")   Wt 82.2 kg (181 lb 4.8 oz)   SpO2 98%   BMI 24.93 kg/m      Constitutional:           Skin:           Head:           Eyes:           ENT:           Neck:           Chest:           Cardiac:                    Abdomen:           Vascular:                                        Extremities and Back:           Neurological:           ASSESSMENT: BP still above target, but improved.        RECOMMENDATIONS:   1) Increase amlodipine to 10 mg daily. Continue rest  of medications/monitor blood pressure at home  2) Mediterranean style weight loss diet  3) Regular exercise program  4) Follow up with YONI in 1 month   5) Consider adding spironolactone if bp suboptimally controlled.       Recent Lab Results:  LIPID RESULTS:  Lab Results   Component Value Date    CHOL 153 12/26/2017    HDL 37 (L) 12/26/2017    LDL 84 12/26/2017    TRIG 161 (H) 12/26/2017    CHOLHDLRATIO 3.1 11/11/2015       LIVER ENZYME RESULTS:  Lab Results   Component Value Date    AST 36 12/26/2019    ALT 50 12/26/2019       CBC RESULTS:  Lab Results   Component Value Date    WBC 6.3 12/26/2019    RBC 5.02 12/26/2019    HGB 14.6 12/26/2019    HCT 43.6 12/26/2019    MCV 87 12/26/2019    MCH 29.1 12/26/2019    MCHC 33.5 12/26/2019    RDW 14.1 12/26/2019     12/26/2019       BMP RESULTS:  Lab Results   Component Value Date     12/26/2019    POTASSIUM 4.2 12/26/2019    CHLORIDE 106 12/26/2019    CO2 31 12/26/2019    ANIONGAP 3 12/26/2019    GLC 89 12/26/2019    BUN 41 (H) 12/26/2019    CR 1.11 12/26/2019    GFRESTIMATED 68 12/26/2019    GFRESTBLACK 79 12/26/2019    ESPERANZA " 9.3 12/26/2019        A1C RESULTS:  No results found for: A1C    INR RESULTS:  Lab Results   Component Value Date    INR 1.04 02/10/2008    INR 0.97 08/25/2007       We greatly appreciate the opportunity to be involved in the care of your patient, Gonzalo Tucker.    Sincerely,  Kuldeep Baldwin MD      CC  Kuldeep Baldwin MD  [address] [facility] [address] [address]  [place] [address]

## 2023-08-25 DIAGNOSIS — I10 BENIGN ESSENTIAL HYPERTENSION: ICD-10-CM

## 2023-08-25 RX ORDER — HYDROCHLOROTHIAZIDE 25 MG/1
25 TABLET ORAL DAILY
Qty: 90 TABLET | Refills: 3 | Status: SHIPPED | OUTPATIENT
Start: 2023-08-25 | End: 2024-08-26

## 2023-09-06 DIAGNOSIS — I25.10 CORONARY ARTERY DISEASE INVOLVING NATIVE CORONARY ARTERY OF NATIVE HEART WITHOUT ANGINA PECTORIS: ICD-10-CM

## 2023-09-06 RX ORDER — IRBESARTAN 150 MG/1
300 TABLET ORAL AT BEDTIME
Qty: 180 TABLET | Refills: 3 | Status: SHIPPED | OUTPATIENT
Start: 2023-09-06 | End: 2024-10-02

## 2023-10-31 DIAGNOSIS — E78.2 MIXED HYPERLIPIDEMIA: ICD-10-CM

## 2023-10-31 DIAGNOSIS — I25.10 CORONARY ARTERY DISEASE INVOLVING NATIVE CORONARY ARTERY OF NATIVE HEART WITHOUT ANGINA PECTORIS: ICD-10-CM

## 2023-10-31 RX ORDER — ATORVASTATIN CALCIUM 80 MG/1
80 TABLET, FILM COATED ORAL DAILY
Qty: 90 TABLET | Refills: 3 | Status: SHIPPED | OUTPATIENT
Start: 2023-10-31

## 2023-10-31 NOTE — TELEPHONE ENCOUNTER
[facility] Refill Guideline reviewed.  Medication meets criteria for refill. FLP done via Lookback 7/2023.

## 2023-11-06 DIAGNOSIS — I10 BENIGN ESSENTIAL HYPERTENSION: ICD-10-CM

## 2023-11-06 DIAGNOSIS — E78.2 MIXED HYPERLIPIDEMIA: ICD-10-CM

## 2023-11-06 DIAGNOSIS — I25.10 CORONARY ARTERY DISEASE INVOLVING NATIVE CORONARY ARTERY OF NATIVE HEART WITHOUT ANGINA PECTORIS: ICD-10-CM

## 2023-11-06 RX ORDER — AMLODIPINE BESYLATE 10 MG/1
10 TABLET ORAL DAILY
Qty: 90 TABLET | Refills: 3 | Status: SHIPPED | OUTPATIENT
Start: 2023-11-06

## 2023-11-07 NOTE — PROGRESS NOTES
~Cardiology Clinic Visit~    Primary Cardiologist: Dr. Baldwin  Reason for visit: Annual follow up    History of Present Illness    Gonzalo Tucker is a very pleasant 73 year old male with a past medical history notable for CAD, HLD, HTN, and idiopathic urticaria.    He has followed with Dr. Baldwin for many years.  At his last visit in August 2023, he remained free of chest, arm, neck, jaw or back discomfort with exertion.  At that time, he noted concern with difficult to manage blood pressure despite being on multiple medications for optimization.  He brought In his home device for calibration.  There was about a 20 point systolic difference between his home machine and our manual cuff reading.  At home, he averaged systolic readings in the 140 range, which correlated to our office value of systolic 120.    His BP regimen consists of Carvedilol 12.5 mg twice daily, hydrochlorothiazide 25 mg daily, irbesartan 300 mg daily, and recently his amlodipine was increased to 10 mg daily.    Interval 11/09/23  He is feeling well on the current regimen, he has no side effects on the regimen.  SBP 140s on initial check; mild improvement after visit.  He had coffee this morning.  He has not been checking his blood pressure since his last office visit.  He has no swelling, LH or dizziness.  He walks about 4 times a week up to 2-3 miles at a time.  He is planning to head to [place] for the winter, and is excited to be welcoming his 4th grandchild this upcoming January.  __________________________________________________________________         Assessment and Impression:     Benign essential hypertension  Suboptimal control.  On multidrug regimen.  No side effects on current medications.  Coronary artery disease  Prior hx of NSTEMI in 2007 with JASON to pLAD  Last ischemic evaluation was negative nuclear stress test in 2019  Chest pain free.  Mixed hyperlipidemia, on atorvastatin         Recommendations and Plan:      We discussed options for ongoing BP management.  At this time, he would like to start tracking BP at home, increase activity, and incorporate DASH diet.  We will keep his medications the same for now.  Return in 1-month for nurse BP check.  If BP remains sub-optimal at that time, will add Spironolactone to his regimen.  __________________________________________________________________    Thank you for the opportunity to participate in this pleasant patient's care.    We would be happy to see this patient sooner for any concerns in the meantime.    ESTEHR Bernard, CNP   Nurse Practitioner  [facility]    Today's clinic visit entailed:  Review of the result(s) of each unique test - cardiac testing, cardiac imaging, labs  Prescription drug management    The level of medical decision making during this visit was of moderate complexity.    Orders this Visit:  Orders Placed This Encounter   Procedures    Follow-Up with Cardiology- YONI     Orders Placed This Encounter   Medications    nitroGLYcerin (NITROSTAT) 0.4 MG sublingual tablet     Sig: For chest pain place 1 tablet under the tongue every 5 minutes for 3 doses. If symptoms persist 5 minutes after 1st dose call 911.     Dispense:  25 tablet     Refill:  1    carvedilol (COREG) 12.5 MG tablet     Sig: Take 1 tablet (12.5 mg) by mouth 2 times daily (with meals)     Dispense:  180 tablet     Refill:  3     Medications Discontinued During This Encounter   Medication Reason    carvedilol (COREG) 12.5 MG tablet Reorder (No AVS)     Encounter Diagnoses   Name Primary?    Coronary artery disease involving native coronary artery of native heart without angina pectoris Yes    Primary hypertension     Mixed hyperlipidemia     Benign essential hypertension      CURRENT MEDICATIONS:  Current Outpatient Medications   Medication Sig Dispense Refill    amLODIPine (NORVASC) 10 MG tablet Take 1 tablet (10 mg) by mouth daily 90 tablet 3    aspirin 81  "MG tablet Take 81 mg by mouth every evening       atorvastatin (LIPITOR) 80 MG tablet Take 1 tablet (80 mg) by mouth daily 90 tablet 3    carvedilol (COREG) 12.5 MG tablet Take 1 tablet (12.5 mg) by mouth 2 times daily (with meals) 180 tablet 3    hydrochlorothiazide (HYDRODIURIL) 25 MG tablet Take 1 tablet (25 mg) by mouth daily 90 tablet 3    irbesartan (AVAPRO) 150 MG tablet Take 2 tablets (300 mg) by mouth At Bedtime 180 tablet 3    multivitamin w/minerals (THERA-VIT-M) tablet Take 1 tablet by mouth daily      nitroGLYcerin (NITROSTAT) 0.4 MG sublingual tablet For chest pain place 1 tablet under the tongue every 5 minutes for 3 doses. If symptoms persist 5 minutes after 1st dose call 911. 25 tablet 1    omalizumab (XOLAIR) 150 MG injection Inject Subcutaneous every 28 days      XOLAIR 150 MG/ML SOSY injection        ALLERGIES     Allergies   Allergen Reactions    Pcn [Penicillins] Rash     PAST MEDICAL, SURGICAL, FAMILY, SOCIAL HISTORY:  History was reviewed and updated as needed, see medical record.    Review of Systems:  A 10-point Review Of Systems is otherwise normal except for that which is noted in the HPI and interval summary.    Physical Exam:    Vitals: /76   Pulse 61   Ht 1.816 m (5' 11.5\")   Wt 83 kg (182 lb 14.4 oz)   SpO2 98%   BMI 25.15 kg/m    Constitutional: Appears stated age, well nourished, NAD.  Neck: Supple. Carotid pulses full and equal.   Respiratory: Non-labored. Lungs CTAB.  Cardiovascular: RRR, normal S1 and S2. No M/G/R.  No edema.  Musculoskeletal/Extremities: Symmetrical movement to all extremities.  Neurologic: No gross focal deficits. Alert, awake, and oriented to all spheres.  Psychiatric: Affect appropriate. Mentation normal.    Recent Lab Results:  LIPID RESULTS:  Lab Results   Component Value Date    CHOL 153 12/26/2017    HDL 37 (L) 12/26/2017    LDL 84 12/26/2017    TRIG 161 (H) 12/26/2017    CHOLHDLRATIO 3.1 11/11/2015     LIVER ENZYME RESULTS:  Lab Results " "  Component Value Date    AST 36 12/26/2019    ALT 50 12/26/2019     CBC RESULTS:  Lab Results   Component Value Date    WBC 6.3 12/26/2019    RBC 5.02 12/26/2019    HGB 14.6 12/26/2019    HCT 43.6 12/26/2019    MCV 87 12/26/2019    MCH 29.1 12/26/2019    MCHC 33.5 12/26/2019    RDW 14.1 12/26/2019     12/26/2019     BMP RESULTS:  Lab Results   Component Value Date     12/26/2019    POTASSIUM 4.2 12/26/2019    CHLORIDE 106 12/26/2019    CO2 31 12/26/2019    ANIONGAP 3 12/26/2019    GLC 89 12/26/2019    BUN 41 (H) 12/26/2019    CR 1.11 12/26/2019    GFRESTIMATED 68 12/26/2019    GFRESTBLACK 79 12/26/2019    ESPERANZA 9.3 12/26/2019      A1C RESULTS:  No results found for: \"A1C\"  INR RESULTS:  Lab Results   Component Value Date    INR 1.04 02/10/2008    INR 0.97 08/25/2007                     "

## 2023-11-09 ENCOUNTER — OFFICE VISIT (OUTPATIENT)
Dept: CARDIOLOGY | Facility: CLINIC | Age: 73
End: 2023-11-09
Payer: MEDICARE

## 2023-11-09 VITALS
BODY MASS INDEX: 24.77 KG/M2 | WEIGHT: 182.9 LBS | SYSTOLIC BLOOD PRESSURE: 138 MMHG | OXYGEN SATURATION: 98 % | HEART RATE: 61 BPM | DIASTOLIC BLOOD PRESSURE: 76 MMHG | HEIGHT: 72 IN

## 2023-11-09 DIAGNOSIS — I25.10 CORONARY ARTERY DISEASE INVOLVING NATIVE CORONARY ARTERY OF NATIVE HEART WITHOUT ANGINA PECTORIS: Primary | ICD-10-CM

## 2023-11-09 DIAGNOSIS — E78.2 MIXED HYPERLIPIDEMIA: ICD-10-CM

## 2023-11-09 DIAGNOSIS — I10 PRIMARY HYPERTENSION: ICD-10-CM

## 2023-11-09 DIAGNOSIS — I10 BENIGN ESSENTIAL HYPERTENSION: ICD-10-CM

## 2023-11-09 PROCEDURE — 99214 OFFICE O/P EST MOD 30 MIN: CPT | Performed by: NURSE PRACTITIONER

## 2023-11-09 RX ORDER — NITROGLYCERIN 0.4 MG/1
TABLET SUBLINGUAL
Qty: 25 TABLET | Refills: 1 | Status: SHIPPED | OUTPATIENT
Start: 2023-11-09

## 2023-11-09 RX ORDER — CARVEDILOL 12.5 MG/1
12.5 TABLET ORAL 2 TIMES DAILY WITH MEALS
Qty: 180 TABLET | Refills: 3 | Status: SHIPPED | OUTPATIENT
Start: 2023-11-09

## 2023-11-09 NOTE — LETTER
11/9/2023    Regan Archuleta MD  [facility] [address] [facility] [address]    RE: Gonzalo Tucker       Dear Colleague,     I had the pleasure of seeing Gonzalo Tucker in the [facility].              ~Cardiology Clinic Visit~    Primary Cardiologist: Dr. Baldwin  Reason for visit: Annual follow up    History of Present Illness    Gonzalo Tucker is a very pleasant 73 year old male with a past medical history notable for CAD, HLD, HTN, and idiopathic urticaria.    He has followed with Dr. Baldwin for many years.  At his last visit in August 2023, he remained free of chest, arm, neck, jaw or back discomfort with exertion.  At that time, he noted concern with difficult to manage blood pressure despite being on multiple medications for optimization.  He brought In his home device for calibration.  There was about a 20 point systolic difference between his home machine and our manual cuff reading.  At home, he averaged systolic readings in the 140 range, which correlated to our office value of systolic 120.    His BP regimen consists of Carvedilol 12.5 mg twice daily, hydrochlorothiazide 25 mg daily, irbesartan 300 mg daily, and recently his amlodipine was increased to 10 mg daily.    Interval 11/09/23  He is feeling well on the current regimen, he has no side effects on the regimen.  SBP 140s on initial check; mild improvement after visit.  He had coffee this morning.  He has not been checking his blood pressure since his last office visit.  He has no swelling, LH or dizziness.  He walks about 4 times a week up to 2-3 miles at a time.  He is planning to head to [place] for the winter, and is excited to be welcoming his 4th grandchild this upcoming January.  __________________________________________________________________         Assessment and Impression:     Benign essential hypertension  Suboptimal control.  On multidrug regimen.  No side effects on current  medications.  Coronary artery disease  Prior hx of NSTEMI in 2007 with JASON to pLAD  Last ischemic evaluation was negative nuclear stress test in 2019  Chest pain free.  Mixed hyperlipidemia, on atorvastatin         Recommendations and Plan:     We discussed options for ongoing BP management.  At this time, he would like to start tracking BP at home, increase activity, and incorporate DASH diet.  We will keep his medications the same for now.  Return in 1-month for nurse BP check.  If BP remains sub-optimal at that time, will add Spironolactone to his regimen.  __________________________________________________________________    Thank you for the opportunity to participate in this pleasant patient's care.    We would be happy to see this patient sooner for any concerns in the meantime.    ESTHER Bernard, CNP   Nurse Practitioner  [facility]    Today's clinic visit entailed:  Review of the result(s) of each unique test - cardiac testing, cardiac imaging, labs  Prescription drug management    The level of medical decision making during this visit was of moderate complexity.    Orders this Visit:  Orders Placed This Encounter   Procedures    Follow-Up with Cardiology- YONI     Orders Placed This Encounter   Medications    nitroGLYcerin (NITROSTAT) 0.4 MG sublingual tablet     Sig: For chest pain place 1 tablet under the tongue every 5 minutes for 3 doses. If symptoms persist 5 minutes after 1st dose call 911.     Dispense:  25 tablet     Refill:  1    carvedilol (COREG) 12.5 MG tablet     Sig: Take 1 tablet (12.5 mg) by mouth 2 times daily (with meals)     Dispense:  180 tablet     Refill:  3     Medications Discontinued During This Encounter   Medication Reason    carvedilol (COREG) 12.5 MG tablet Reorder (No AVS)     Encounter Diagnoses   Name Primary?    Coronary artery disease involving native coronary artery of native heart without angina pectoris Yes    Primary hypertension     Mixed  "hyperlipidemia     Benign essential hypertension      CURRENT MEDICATIONS:  Current Outpatient Medications   Medication Sig Dispense Refill    amLODIPine (NORVASC) 10 MG tablet Take 1 tablet (10 mg) by mouth daily 90 tablet 3    aspirin 81 MG tablet Take 81 mg by mouth every evening       atorvastatin (LIPITOR) 80 MG tablet Take 1 tablet (80 mg) by mouth daily 90 tablet 3    carvedilol (COREG) 12.5 MG tablet Take 1 tablet (12.5 mg) by mouth 2 times daily (with meals) 180 tablet 3    hydrochlorothiazide (HYDRODIURIL) 25 MG tablet Take 1 tablet (25 mg) by mouth daily 90 tablet 3    irbesartan (AVAPRO) 150 MG tablet Take 2 tablets (300 mg) by mouth At Bedtime 180 tablet 3    multivitamin w/minerals (THERA-VIT-M) tablet Take 1 tablet by mouth daily      nitroGLYcerin (NITROSTAT) 0.4 MG sublingual tablet For chest pain place 1 tablet under the tongue every 5 minutes for 3 doses. If symptoms persist 5 minutes after 1st dose call 911. 25 tablet 1    omalizumab (XOLAIR) 150 MG injection Inject Subcutaneous every 28 days      XOLAIR 150 MG/ML SOSY injection        ALLERGIES     Allergies   Allergen Reactions    Pcn [Penicillins] Rash     PAST MEDICAL, SURGICAL, FAMILY, SOCIAL HISTORY:  History was reviewed and updated as needed, see medical record.    Review of Systems:  A 10-point Review Of Systems is otherwise normal except for that which is noted in the HPI and interval summary.    Physical Exam:    Vitals: /76   Pulse 61   Ht 1.816 m (5' 11.5\")   Wt 83 kg (182 lb 14.4 oz)   SpO2 98%   BMI 25.15 kg/m    Constitutional: Appears stated age, well nourished, NAD.  Neck: Supple. Carotid pulses full and equal.   Respiratory: Non-labored. Lungs CTAB.  Cardiovascular: RRR, normal S1 and S2. No M/G/R.  No edema.  Musculoskeletal/Extremities: Symmetrical movement to all extremities.  Neurologic: No gross focal deficits. Alert, awake, and oriented to all spheres.  Psychiatric: Affect appropriate. Mentation " "normal.    Recent Lab Results:  LIPID RESULTS:  Lab Results   Component Value Date    CHOL 153 12/26/2017    HDL 37 (L) 12/26/2017    LDL 84 12/26/2017    TRIG 161 (H) 12/26/2017    CHOLHDLRATIO 3.1 11/11/2015     LIVER ENZYME RESULTS:  Lab Results   Component Value Date    AST 36 12/26/2019    ALT 50 12/26/2019     CBC RESULTS:  Lab Results   Component Value Date    WBC 6.3 12/26/2019    RBC 5.02 12/26/2019    HGB 14.6 12/26/2019    HCT 43.6 12/26/2019    MCV 87 12/26/2019    MCH 29.1 12/26/2019    MCHC 33.5 12/26/2019    RDW 14.1 12/26/2019     12/26/2019     BMP RESULTS:  Lab Results   Component Value Date     12/26/2019    POTASSIUM 4.2 12/26/2019    CHLORIDE 106 12/26/2019    CO2 31 12/26/2019    ANIONGAP 3 12/26/2019    GLC 89 12/26/2019    BUN 41 (H) 12/26/2019    CR 1.11 12/26/2019    GFRESTIMATED 68 12/26/2019    GFRESTBLACK 79 12/26/2019    ESPERANZA 9.3 12/26/2019      A1C RESULTS:  No results found for: \"A1C\"  INR RESULTS:  Lab Results   Component Value Date    INR 1.04 02/10/2008    INR 0.97 08/25/2007                     Thank you for allowing me to participate in the care of your patient.      Sincerely,     ESTHER Bernard [facility]  cc:   Kuldeep Baldwin MD  [address]  [place] [id number]      "

## 2023-11-09 NOTE — PATIENT INSTRUCTIONS
Thank you for your visit with the [facility] today.    Today's Summary     DASH diet for blood pressure control.  Check BP at home and keep a log  Follow up in 1-month with nurse blood pressure check.    If you have questions or concerns, please do not hesitate to call my nursing support team at [phone].    Scheduling phone number: [phone]  [facility] Number: [phone]    It was a pleasure seeing you today.     ESTHER Bernard, CNP  Nurse Practitioner  [facility]  November 9, 2023  ________________________________________________________

## 2023-12-15 ENCOUNTER — ALLIED HEALTH/NURSE VISIT (OUTPATIENT)
Dept: CARDIOLOGY | Facility: CLINIC | Age: 73
End: 2023-12-15
Attending: NURSE PRACTITIONER
Payer: MEDICARE

## 2023-12-15 ENCOUNTER — TELEPHONE (OUTPATIENT)
Dept: CARDIOLOGY | Facility: CLINIC | Age: 73
End: 2023-12-15

## 2023-12-15 DIAGNOSIS — I10 PRIMARY HYPERTENSION: ICD-10-CM

## 2023-12-15 PROCEDURE — 99207 PR NO CHARGE NURSE ONLY: CPT

## 2023-12-15 NOTE — CONFIDENTIAL NOTE
Last office visit: 11/9/23    Previous blood pressure: 138/76  Mm Hg     Previous heart rate: 61  bpm    Morning medications were taken at: 7:15 am    Today's blood pressure:   129/69 mm Hg    Today's heart rate:   53 bpm     Ordering Provider:ESTHER Bernard, CNP    Results sent to team # :6    Additional comments:Home BP monitor reading, here in clinic today is 135/82 and HR of 53.    His numbers at home are significantly higher, in the 137-146 range for systolic, however, he typically takes his BP in the afternoon.      JOCELYN Hammonds

## 2023-12-15 NOTE — TELEPHONE ENCOUNTER
Please see in office BP recheck.  Per last OV note:        Recommendations and Plan:      We discussed options for ongoing BP management.  At this time, he would like to start tracking BP at home, increase activity, and incorporate DASH diet.  We will keep his medications the same for now.  Return in 1-month for nurse BP check.  If BP remains sub-optimal at that time, will add Spironolactone to his regimen.    Last office visit: 11/9/23     Previous blood pressure: 138/76  Mm Hg      Previous heart rate: 61  bpm     Morning medications were taken at: 7:15 am     Today's blood pressure:   129/69 mm Hg     Today's heart rate:   53 bpm      Ordering Provider:ESTHER Bernard, CNP     Results sent to team # :6     Additional comments:Home BP monitor reading, here in clinic today is 135/82 and HR of 53.     His numbers at home are significantly higher, in the 137-146 range for systolic, however, he typically takes his BP in the afternoon.

## 2024-08-26 DIAGNOSIS — I10 BENIGN ESSENTIAL HYPERTENSION: ICD-10-CM

## 2024-08-26 RX ORDER — HYDROCHLOROTHIAZIDE 25 MG/1
25 TABLET ORAL DAILY
Qty: 90 TABLET | Refills: 1 | Status: SHIPPED | OUTPATIENT
Start: 2024-08-26

## 2024-10-02 DIAGNOSIS — I25.10 CORONARY ARTERY DISEASE INVOLVING NATIVE CORONARY ARTERY OF NATIVE HEART WITHOUT ANGINA PECTORIS: ICD-10-CM

## 2024-10-02 RX ORDER — IRBESARTAN 150 MG/1
300 TABLET ORAL AT BEDTIME
Qty: 180 TABLET | Refills: 1 | Status: SHIPPED | OUTPATIENT
Start: 2024-10-02

## 2024-10-17 DIAGNOSIS — I25.10 CORONARY ARTERY DISEASE INVOLVING NATIVE CORONARY ARTERY OF NATIVE HEART WITHOUT ANGINA PECTORIS: ICD-10-CM

## 2024-10-17 DIAGNOSIS — E78.2 MIXED HYPERLIPIDEMIA: ICD-10-CM

## 2024-10-17 DIAGNOSIS — I10 BENIGN ESSENTIAL HYPERTENSION: ICD-10-CM

## 2024-10-17 RX ORDER — AMLODIPINE BESYLATE 10 MG/1
10 TABLET ORAL DAILY
Qty: 90 TABLET | Refills: 3 | Status: SHIPPED | OUTPATIENT
Start: 2024-10-17

## 2024-10-17 RX ORDER — ATORVASTATIN CALCIUM 80 MG/1
80 TABLET, FILM COATED ORAL DAILY
Qty: 90 TABLET | Refills: 0 | Status: SHIPPED | OUTPATIENT
Start: 2024-10-17

## 2024-12-24 DIAGNOSIS — E78.2 MIXED HYPERLIPIDEMIA: ICD-10-CM

## 2024-12-24 DIAGNOSIS — I10 BENIGN ESSENTIAL HYPERTENSION: ICD-10-CM

## 2024-12-24 DIAGNOSIS — I25.10 CORONARY ARTERY DISEASE INVOLVING NATIVE CORONARY ARTERY OF NATIVE HEART WITHOUT ANGINA PECTORIS: ICD-10-CM

## 2024-12-24 RX ORDER — CARVEDILOL 12.5 MG/1
12.5 TABLET ORAL 2 TIMES DAILY WITH MEALS
Qty: 180 TABLET | Refills: 0 | Status: SHIPPED | OUTPATIENT
Start: 2024-12-24

## (undated) DEVICE — ENDO FORCEP BX CAPTURA JUMBO SPIKE 2.8MMX230CM G53042

## (undated) DEVICE — KIT ENDO TURNOVER/PROCEDURE W/CLEAN A SCOPE LINERS 103888

## (undated) RX ORDER — FENTANYL CITRATE 50 UG/ML
INJECTION, SOLUTION INTRAMUSCULAR; INTRAVENOUS
Status: DISPENSED
Start: 2021-05-26